# Patient Record
Sex: FEMALE | Race: WHITE | NOT HISPANIC OR LATINO | URBAN - METROPOLITAN AREA
[De-identification: names, ages, dates, MRNs, and addresses within clinical notes are randomized per-mention and may not be internally consistent; named-entity substitution may affect disease eponyms.]

---

## 2017-04-27 ENCOUNTER — GENERIC CONVERSION - ENCOUNTER (OUTPATIENT)
Dept: OTHER | Facility: OTHER | Age: 72
End: 2017-04-27

## 2018-05-07 ENCOUNTER — TELEPHONE (OUTPATIENT)
Dept: OBGYN CLINIC | Facility: CLINIC | Age: 73
End: 2018-05-07

## 2018-05-07 DIAGNOSIS — B00.9 HERPES: Primary | ICD-10-CM

## 2018-05-07 RX ORDER — VALACYCLOVIR HYDROCHLORIDE 500 MG/1
500 TABLET, FILM COATED ORAL 2 TIMES DAILY
Qty: 14 TABLET | Refills: 0 | Status: SHIPPED | OUTPATIENT
Start: 2018-05-07 | End: 2018-08-16 | Stop reason: SDUPTHER

## 2018-05-07 NOTE — TELEPHONE ENCOUNTER
Needs refill of valtrex  Has herpes outbreak  Has appointment with you on may 29th for yearly  Please call into [facility] on [address]

## 2018-05-29 ENCOUNTER — ANNUAL EXAM (OUTPATIENT)
Dept: OBGYN CLINIC | Facility: CLINIC | Age: 73
End: 2018-05-29
Payer: COMMERCIAL

## 2018-05-29 VITALS
WEIGHT: 184.6 LBS | SYSTOLIC BLOOD PRESSURE: 98 MMHG | HEIGHT: 64 IN | DIASTOLIC BLOOD PRESSURE: 76 MMHG | BODY MASS INDEX: 31.51 KG/M2

## 2018-05-29 DIAGNOSIS — Z01.419 WOMEN'S ANNUAL ROUTINE GYNECOLOGICAL EXAMINATION: Primary | ICD-10-CM

## 2018-05-29 DIAGNOSIS — Z12.39 BREAST CANCER SCREENING: ICD-10-CM

## 2018-05-29 PROCEDURE — S0612 ANNUAL GYNECOLOGICAL EXAMINA: HCPCS | Performed by: OBSTETRICS & GYNECOLOGY

## 2018-05-29 RX ORDER — ESCITALOPRAM OXALATE 10 MG/1
10 TABLET ORAL
COMMUNITY
Start: 2018-02-14 | End: 2019-02-14

## 2018-05-29 RX ORDER — ROSUVASTATIN CALCIUM 10 MG/1
5 TABLET, COATED ORAL
COMMUNITY
Start: 2016-09-28

## 2018-05-29 RX ORDER — AMITRIPTYLINE HYDROCHLORIDE 50 MG/1
TABLET, FILM COATED ORAL
COMMUNITY

## 2018-05-29 RX ORDER — ZOLPIDEM TARTRATE 12.5 MG/1
TABLET, FILM COATED, EXTENDED RELEASE ORAL
COMMUNITY
Start: 2018-01-22

## 2018-05-29 RX ORDER — ERGOCALCIFEROL (VITAMIN D2) 1250 MCG
CAPSULE ORAL
COMMUNITY

## 2018-05-29 NOTE — PATIENT INSTRUCTIONS
The patient was informed of a stable postmenopausal gyn examination  A Pap smear was not performed because of her age  Is recommended she continue getting mammograms on a regular basis  She should return my office in 2-3 years pending the onset of new problems

## 2018-05-29 NOTE — PROGRESS NOTES
This is a 74-year-old white female, she is a  3 para 3  She is many years status post hysterectomy  She is menopausal   She has a history of a vaginal sling  She is currently taking medication including Lexapro, Crestor, and Ambien  She was also on Valtrex for a recent genital herpes outbreak  She feels little bit weeks secondary to having the flu followed by pneumonia  She is feeling a bit better at this time  Bladder control since to be well status post her sling procedure  She denies any problem with bladder control  Colonoscopies are up-to-date  She will need to make arrangements for mammogram   There are no new major family illnesses report  She still desires intimacy  Review of systems noncontributory  She is feeling better after being the flu and pneumonia  She has a history of no counseling she has known  replacement injections  Medical history reviewed above  Surgical history significant for total abdominal hysterectomy with BS O  ATV T  Removal gallbladder  Vein surgery  Foot surgery for arthritis  Family history significant for lung disease, COPD for mother, emphysema for grandfather  Physical exam this is a well-developed well-oriented 74-year-old white female  She is in no acute distress  HEENT is was within normal limits cardiac exam shows a regular rhythm and rate no murmur normal S1-S2 does no gallop  Lungs are clear to A& P  There is no wheezing  Breast exam symmetrical no masses nontender axilla clear bilaterally abdominal exam there is evidence of a prior cholecystectomy scar and the prior  scar there is no rebound no guarding positive bowel size  Pelvic exam the external genitalia are normal menopausal state there is actually good moisture  The the cervix uterus is surgically removed  The vault well supported  There is no prolapse  Adnexa clear bilaterally  Impression stable menopausal gyn examination    She will continue to use lubricant with intimacy  She is return my office in 2 years unless new problems arise  Keep me informed of her progress

## 2018-08-16 ENCOUNTER — TELEPHONE (OUTPATIENT)
Dept: OBGYN CLINIC | Facility: CLINIC | Age: 73
End: 2018-08-16

## 2018-08-16 DIAGNOSIS — B00.9 HERPES: ICD-10-CM

## 2018-08-16 RX ORDER — VALACYCLOVIR HYDROCHLORIDE 500 MG/1
500 TABLET, FILM COATED ORAL 2 TIMES DAILY
Qty: 14 TABLET | Refills: 0 | Status: SHIPPED | OUTPATIENT
Start: 2018-08-16 | End: 2018-08-23

## 2020-06-02 ENCOUNTER — ANNUAL EXAM (OUTPATIENT)
Dept: OBGYN CLINIC | Facility: CLINIC | Age: 75
End: 2020-06-02
Payer: COMMERCIAL

## 2020-06-02 VITALS
WEIGHT: 187.4 LBS | HEIGHT: 64 IN | SYSTOLIC BLOOD PRESSURE: 100 MMHG | BODY MASS INDEX: 31.99 KG/M2 | TEMPERATURE: 99.5 F | DIASTOLIC BLOOD PRESSURE: 64 MMHG

## 2020-06-02 DIAGNOSIS — Z01.419 WOMEN'S ANNUAL ROUTINE GYNECOLOGICAL EXAMINATION: ICD-10-CM

## 2020-06-02 DIAGNOSIS — Z12.39 BREAST CANCER SCREENING: Primary | ICD-10-CM

## 2020-06-02 PROCEDURE — 99397 PER PM REEVAL EST PAT 65+ YR: CPT | Performed by: OBSTETRICS & GYNECOLOGY

## 2020-06-02 RX ORDER — SERTRALINE HYDROCHLORIDE 100 MG/1
TABLET, FILM COATED ORAL
COMMUNITY
Start: 2020-05-16

## 2020-06-02 RX ORDER — FUROSEMIDE 40 MG/1
TABLET ORAL
COMMUNITY
Start: 2020-05-18

## 2020-06-02 RX ORDER — LOSARTAN POTASSIUM 100 MG/1
100 TABLET ORAL DAILY
COMMUNITY
Start: 2019-09-09 | End: 2021-12-06

## 2020-06-02 RX ORDER — METOPROLOL SUCCINATE 50 MG/1
50 TABLET, EXTENDED RELEASE ORAL DAILY
COMMUNITY
Start: 2020-02-25 | End: 2021-12-06

## 2020-06-02 RX ORDER — DULOXETIN HYDROCHLORIDE 30 MG/1
30 CAPSULE, DELAYED RELEASE ORAL DAILY
COMMUNITY
Start: 2019-12-11 | End: 2021-08-25

## 2020-11-19 ENCOUNTER — EVALUATION (OUTPATIENT)
Dept: PHYSICAL THERAPY | Age: 75
End: 2020-11-19
Payer: COMMERCIAL

## 2020-11-19 DIAGNOSIS — M51.36 DEGENERATIVE DISC DISEASE, LUMBAR: Primary | ICD-10-CM

## 2020-11-19 PROCEDURE — 97163 PT EVAL HIGH COMPLEX 45 MIN: CPT | Performed by: PHYSICAL THERAPIST

## 2020-11-27 ENCOUNTER — OFFICE VISIT (OUTPATIENT)
Dept: PHYSICAL THERAPY | Age: 75
End: 2020-11-27
Payer: COMMERCIAL

## 2020-11-27 DIAGNOSIS — M51.36 DEGENERATIVE DISC DISEASE, LUMBAR: Primary | ICD-10-CM

## 2020-11-27 PROCEDURE — 97113 AQUATIC THERAPY/EXERCISES: CPT | Performed by: PHYSICAL THERAPIST

## 2020-11-27 RX ORDER — EPLERENONE 50 MG/1
50 TABLET, FILM COATED ORAL DAILY
COMMUNITY

## 2020-11-27 RX ORDER — ASPIRIN 81 MG/1
81 TABLET ORAL 2 TIMES DAILY
COMMUNITY

## 2020-11-30 ENCOUNTER — OFFICE VISIT (OUTPATIENT)
Dept: PHYSICAL THERAPY | Age: 75
End: 2020-11-30
Payer: COMMERCIAL

## 2020-11-30 DIAGNOSIS — M51.36 DEGENERATIVE DISC DISEASE, LUMBAR: Primary | ICD-10-CM

## 2020-11-30 PROCEDURE — 97113 AQUATIC THERAPY/EXERCISES: CPT

## 2020-12-03 ENCOUNTER — OFFICE VISIT (OUTPATIENT)
Dept: PHYSICAL THERAPY | Age: 75
End: 2020-12-03
Payer: COMMERCIAL

## 2020-12-03 DIAGNOSIS — M51.36 DEGENERATIVE DISC DISEASE, LUMBAR: Primary | ICD-10-CM

## 2020-12-03 PROCEDURE — 97113 AQUATIC THERAPY/EXERCISES: CPT | Performed by: PHYSICAL THERAPIST

## 2020-12-09 ENCOUNTER — APPOINTMENT (OUTPATIENT)
Dept: PHYSICAL THERAPY | Age: 75
End: 2020-12-09
Payer: COMMERCIAL

## 2020-12-10 ENCOUNTER — OFFICE VISIT (OUTPATIENT)
Dept: PHYSICAL THERAPY | Age: 75
End: 2020-12-10
Payer: COMMERCIAL

## 2020-12-10 DIAGNOSIS — M51.36 DEGENERATIVE DISC DISEASE, LUMBAR: Primary | ICD-10-CM

## 2020-12-10 PROCEDURE — 97113 AQUATIC THERAPY/EXERCISES: CPT

## 2020-12-14 ENCOUNTER — OFFICE VISIT (OUTPATIENT)
Dept: PHYSICAL THERAPY | Age: 75
End: 2020-12-14
Payer: COMMERCIAL

## 2020-12-14 DIAGNOSIS — M51.36 DEGENERATIVE DISC DISEASE, LUMBAR: Primary | ICD-10-CM

## 2020-12-14 PROCEDURE — 97113 AQUATIC THERAPY/EXERCISES: CPT

## 2020-12-16 ENCOUNTER — APPOINTMENT (OUTPATIENT)
Dept: PHYSICAL THERAPY | Age: 75
End: 2020-12-16
Payer: COMMERCIAL

## 2020-12-17 ENCOUNTER — OFFICE VISIT (OUTPATIENT)
Dept: PHYSICAL THERAPY | Age: 75
End: 2020-12-17
Payer: COMMERCIAL

## 2020-12-17 ENCOUNTER — TRANSCRIBE ORDERS (OUTPATIENT)
Dept: PHYSICAL THERAPY | Age: 75
End: 2020-12-17

## 2020-12-17 DIAGNOSIS — M51.36 DEGENERATIVE DISC DISEASE, LUMBAR: Primary | ICD-10-CM

## 2020-12-17 DIAGNOSIS — M51.36 DEGENERATION OF LUMBAR INTERVERTEBRAL DISC: Primary | ICD-10-CM

## 2020-12-17 PROCEDURE — 97113 AQUATIC THERAPY/EXERCISES: CPT | Performed by: PHYSICAL THERAPIST

## 2020-12-17 PROCEDURE — 97750 PHYSICAL PERFORMANCE TEST: CPT | Performed by: PHYSICAL THERAPIST

## 2020-12-18 ENCOUNTER — APPOINTMENT (OUTPATIENT)
Dept: PHYSICAL THERAPY | Age: 75
End: 2020-12-18
Payer: COMMERCIAL

## 2020-12-21 ENCOUNTER — OFFICE VISIT (OUTPATIENT)
Dept: PHYSICAL THERAPY | Age: 75
End: 2020-12-21
Payer: COMMERCIAL

## 2020-12-21 DIAGNOSIS — M51.36 DEGENERATIVE DISC DISEASE, LUMBAR: Primary | ICD-10-CM

## 2020-12-21 PROCEDURE — 97113 AQUATIC THERAPY/EXERCISES: CPT

## 2020-12-28 ENCOUNTER — APPOINTMENT (OUTPATIENT)
Dept: PHYSICAL THERAPY | Age: 75
End: 2020-12-28
Payer: COMMERCIAL

## 2020-12-30 ENCOUNTER — APPOINTMENT (OUTPATIENT)
Dept: PHYSICAL THERAPY | Age: 75
End: 2020-12-30
Payer: COMMERCIAL

## 2021-02-08 ENCOUNTER — TRANSCRIBE ORDERS (OUTPATIENT)
Dept: PHYSICAL THERAPY | Age: 76
End: 2021-02-08

## 2021-02-08 DIAGNOSIS — M51.36 DEGENERATION OF LUMBAR INTERVERTEBRAL DISC: Primary | ICD-10-CM

## 2021-03-02 DIAGNOSIS — Z23 ENCOUNTER FOR IMMUNIZATION: ICD-10-CM

## 2021-08-05 ENCOUNTER — TELEPHONE (OUTPATIENT)
Dept: HEMATOLOGY ONCOLOGY | Facility: CLINIC | Age: 76
End: 2021-08-05

## 2021-08-05 NOTE — TELEPHONE ENCOUNTER
New Patient Request   Patient Details:     Jami Kaur      1945      [id number]      Reason for Appointment   Who is calling to schedule? Yousif    If not Patient, what is their name? From office    What is the diagnosis? Abnormal protein    Who is the referring doctor? Dr Mg Aguayo are you scheduling with ? Hemotology    Preferred [employer] Number to call back on? If calling from the [facility], use the Nurse number   [phone]   Miscellaneous Information: Only afternoon/ will be faxing lab orders    Please advise the patient, a new patient  will be calling them back within 1 business day

## 2021-08-10 ENCOUNTER — TELEPHONE (OUTPATIENT)
Dept: SURGICAL ONCOLOGY | Facility: CLINIC | Age: 76
End: 2021-08-10

## 2021-08-10 NOTE — TELEPHONE ENCOUNTER
New Patient Encounter    New Patient Intake Form   Patient Details:  Mady Cherry  1945  [id number]    Background Information:  [address] starts by opening a telephone encounter and gathering the following information   Who is calling to schedule? If not self, relationship to patient? Patient   Referring Provider Dr Anand Watts   What is the diagnosis? SPE pattern demonstrates the possibility of a faint band previously identified as a monoclonal protein (faint IgM Kappa   Is this Cancer or Non-Cancer? Non-Cancer   Is this diagnosis confirmed? Yes   When was the diagnosis? 7/2021 labs   Is there a confirmed diagnosis from a biopsy/tissue reviewed by pathology? No   Were outside slides requested? No   Is patient aware of diagnosis? Yes   Is there a personal history and what kind? No   Is there a family history and what kind? No   Reason for visit? New Diagnosis   Have you had any imaging or labs done? If so: when, where? yes  [facility]   Are records in EPIC? yes   If patient has a prior history of cancer were old records obtained? NA   Was the patient told to bring a disk? No   Does the patient smoke or Vape? no   If yes, how many packs or cartridges per day? Scheduling Information:   Preferred [place]:  [place]     Are there any dates/time the patient cannot be seen? Miscellaneous:    After completing the above information, please route to Financial Counselor and the appropriate Nurse Navigator for review

## 2021-08-24 ENCOUNTER — TELEPHONE (OUTPATIENT)
Dept: HEMATOLOGY ONCOLOGY | Facility: CLINIC | Age: 76
End: 2021-08-24

## 2021-08-24 NOTE — TELEPHONE ENCOUNTER
I called patient and left voicemail reminding her of her appointment tomorrow   I also requested she call us to go over the Covid Screening questions  -Marti Ruby

## 2021-08-25 ENCOUNTER — CONSULT (OUTPATIENT)
Dept: HEMATOLOGY ONCOLOGY | Facility: CLINIC | Age: 76
End: 2021-08-25
Payer: COMMERCIAL

## 2021-08-25 VITALS
RESPIRATION RATE: 16 BRPM | TEMPERATURE: 98.8 F | OXYGEN SATURATION: 94 % | HEART RATE: 69 BPM | SYSTOLIC BLOOD PRESSURE: 102 MMHG | BODY MASS INDEX: 33.27 KG/M2 | WEIGHT: 187.8 LBS | HEIGHT: 63 IN | DIASTOLIC BLOOD PRESSURE: 62 MMHG

## 2021-08-25 DIAGNOSIS — E88.09 PLASMA CELL DYSCRASIA: Primary | ICD-10-CM

## 2021-08-25 PROCEDURE — 99205 OFFICE O/P NEW HI 60 MIN: CPT | Performed by: INTERNAL MEDICINE

## 2021-08-25 RX ORDER — METHIMAZOLE 5 MG/1
TABLET ORAL
COMMUNITY
Start: 2021-08-05

## 2021-08-25 RX ORDER — DIVALPROEX SODIUM 500 MG/1
TABLET, DELAYED RELEASE ORAL
COMMUNITY
Start: 2021-06-09

## 2021-08-25 NOTE — PROGRESS NOTES
Hematology/Oncology Consult Note    Date of Service: 8/25/2021    [facility]  [address]  [facility] [id number] [phone]    Reason for Consultation:   abnormal SPEP result    AJCC 8th Edition Cancer Stage:  Cancer Staging  No matching staging information was found for the patient  Referral Physician: Dr Harriet Cardona    Oncology/Hematology History:  ·  June 9, 2021 SPEP  Shows a faint band previous identified as a monoclonal protein, IgM kappa type  · August 10, 2021 SPEP showed a faint band  · July 20, 2021 SPEP shows possibly a faint band previously identified as monoclonal protein, faint IgM kappa type  · July 27, 2021 TSH normal   Free T4 normal   CMP showed creatinine 1 22, BUN 20, eGFR 44  CBC normal   ·  August 19, 2021, BUN 39, creatinine 1 8  eGFR 27   · August 23, 2021, creatinine 0 95, eGFR 59  Assessment and Recommendations:     I personally reviewed the lab results,  the image studies,  pathology, other specialty/physicians consult notes and recommendations, and outside medical records  I had a lengthy discussion with the patient and shared the work-up findings  We discussed the diagnosis and management plan as below  1  Plasma cell dyscrasia, 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  Most recent lab showed normal CBC and CMP  Calcium is normal   Total protein also within normal limits  This can be a non specific findings versus and MGUS  I will check quantitative immunoglobulin, serum free light chain  Patient had negative CT scan and X -Rays in [facility] that already covered most of the bones  I will follow the results  Bone marrow biopsy only if patient has significant elevated serum free light chain ratio and M spike  I will repeat CBC, CMP, quantitative immunoglobulin, serum free light chain, SPEP in 6 months  2  Idiopathic epilepsy  No seizure activity  Patient continues current medication  Following up with neurologist     3  Follow-up: Return to clinic in 6 months with labs prior  Thank you very much for your consultation and making us part of this nice patient's care  I will continue to follow closely with you  Please contact me with any additional questions  Disclaimer: This document was prepared using [employer] Direct technology  If a word or phrase is confusing, or does not make sense, this is likely due to recognition error which was not discovered during the providers review  If you believe an error has occurred, please contact me through [employer] service for mulu? cation  HPI:   Mine Frost is a 78 y o  female with a past medical history of  Idiopathic epilepsy that was evaluated by neurologist Dayanara Mancera in  [facility]  Lab showed normal CBC and slight elevated creatinine  SPEP in June 2021 showed a faint monoclonal protein, IgM kappa type  Repeat SPEP in July 20, 2021 again showed a faint band, previous identified as IgM kappa type  Most recent lab showed normal kidney function and CBC  The patient is referred to Hematology Clinic for evaluation of possible plasma cell dyscrasia  Patient feels fine except itching  No fever or chills  No exertional chest pain, diaphoresis or shortness  of breath  No cough and phlegm, no hemoptysis  Patient had nausea  without vomiting this morning  No abdominal pain  No diarrhea or constipation  No  symptoms  No headache or blurred vision  No seizure activity  Appetite good  No significant weight loss or weight gain  The patient denies bleeding anywhere  Nausea in the morning, no vomiting  Patient also complained of itching comes and goes      Review of system:  12-point review of system was performed, pertinent positive and negative were detailed as above    Past Medical History:   Diagnosis Date    Pacemaker     Total knee replacement status, left        Past Surgical History: Procedure Laterality Date    CHOLECYSTECTOMY      HYSTERECTOMY         No family history on file  Social History     Socioeconomic History    Marital status: /Civil Union     Spouse name: Not on file    Number of children: Not on file    Years of education: Not on file    Highest education level: Not on file   Occupational History    Not on file   Tobacco Use    Smoking status: Never Smoker    Smokeless tobacco: Never Used   Substance and Sexual Activity    Alcohol use: Yes     Comment: very little    Drug use: No    Sexual activity: Yes     Partners: Male     Birth control/protection: Post-menopausal   Other Topics Concern    Not on file   Social History Narrative    Not on file     Social Determinants of Health     Financial Resource Strain:     Difficulty of Paying Living Expenses:    Food Insecurity:     Worried About Running Out of Food in the Last Year:     [address] in the Last Year:    Transportation Needs:     Lack of Transportation (Medical):  Lack of Transportation (Non-Medical):    Physical Activity:     Days of Exercise per Week:     Minutes of Exercise per Session:    Stress:     Feeling of Stress :    Social Connections:     Frequency of Communication with Friends and Family:     Frequency of Social Gatherings with Friends and Family:     Attends [employer] Services:     Active Member of Clubs or Organizations:     Attends Club or Organization Meetings:     Marital Status:    Intimate Partner Violence:     Fear of Current or Ex-Partner:     Emotionally Abused:     Physically Abused:     Sexually Abused:         Allergies   Allergen Reactions    Penicillin G     Sacubitril-Valsartan      Possible angioedema       Current Outpatient Medications   Medication Sig Dispense Refill    aspirin (ECOTRIN LOW STRENGTH) 81 mg EC tablet Take 81 mg by mouth 2 (two) times a day      denosumab (PROLIA) 60 mg/mL Inject 60 mg under the skin      denosumab (PROLIA) 60 mg/mL Inject 60 mg under the skin      divalproex sodium (DEPAKOTE) 500 mg EC tablet       DULoxetine (Cymbalta) 30 mg delayed release capsule Take 30 mg by mouth daily      eplerenone (INSPRA) 50 MG tablet Take 50 mg by mouth daily      ergocalciferol (ERGOCALCIFEROL) 05640 units capsule TK 1 C PO 1 TIME A WEEK      furosemide (LASIX) 40 mg tablet TAKE 1 TABLET BY MOUTH EVERY DAY      losartan (COZAAR) 100 MG tablet Take 100 mg by mouth daily      methimazole (TAPAZOLE) 5 mg tablet       metoprolol succinate (TOPROL-XL) 50 mg 24 hr tablet Take 50 mg by mouth daily      rosuvastatin (CRESTOR) 10 MG tablet Take 5 mg by mouth      amitriptyline (ELAVIL) 50 mg tablet TK 1 T PO  D (Patient not taking: Reported on 8/25/2021)      escitalopram (LEXAPRO) 10 mg tablet Take 10 mg by mouth (Patient not taking: Reported on 8/25/2021)      sertraline (ZOLOFT) 100 mg tablet  (Patient not taking: Reported on 8/25/2021)      valACYclovir (VALTREX) 500 mg tablet Take 1 tablet (500 mg total) by mouth 2 (two) times a day for 7 days (Patient not taking: Reported on 8/25/2021) 14 tablet 0    zolpidem (AMBIEN CR) 12 5 MG CR tablet TAKE 1 TABLET BY MOUTH EVERY DAY AT BEDTIME AS NEEDED FOR SLEEP (Patient not taking: Reported on 8/25/2021)       No current facility-administered medications for this visit  Objective:     24 Hour Vitals Assessment:     Vitals:    08/25/21 1350   BP: 102/62   Pulse: 69   Resp: 16   Temp: 98 8 °F (37 1 °C)   SpO2: 94%       PHYSICIAN EXAM:    General: Appearance: alert, cooperative, no distress  HEENT: Normocephalic, atraumatic  No scleral icterus  conjunctivae clear  PERRL, EOM's intact  No sinus drainage or tenderness  Chest: No tenderness  Lungs: Clear to auscultation bilaterally, Respirations unlabored  Cardiac: Regular rate and rhythm, S1and S2 are normal, no murmur, click, rubs or gallops  Abdomen: Soft, non-tender, non-distended   Bowel sounds are normal  No masses, no organomegaly  Pelvic: deferred  Extremities:  No edema, cyanosis, clubbing  Skin: Skin color, turgor are normal  No rashes  Lymphatics: no palpable adenopathy  Neurologic: Alert and oriented X 3, Cranial nerve 2-12 grosely intact  Normal strength and sensation  DATA REVIEW:    Pathology Result:    No results found for: FINAL[facility]     Image Results:   Image result are reviewed and documented in Hematology/Oncology history    No image results found  LABS:  Lab data are reviewed and documented in HemOnc history  No results found for this or any previous visit (from the past 72 hour(s))  By:  Oskar Oreilly MD, 8/25/2021, 2:52 PM                                  Primary Care Physician:  No primary care provider on file

## 2021-08-27 ENCOUNTER — TELEPHONE (OUTPATIENT)
Dept: HEMATOLOGY ONCOLOGY | Facility: CLINIC | Age: 76
End: 2021-08-27

## 2021-12-06 ENCOUNTER — OFFICE VISIT (OUTPATIENT)
Dept: OBGYN CLINIC | Facility: CLINIC | Age: 76
End: 2021-12-06
Payer: COMMERCIAL

## 2021-12-06 VITALS
DIASTOLIC BLOOD PRESSURE: 60 MMHG | BODY MASS INDEX: 33.35 KG/M2 | HEIGHT: 63 IN | SYSTOLIC BLOOD PRESSURE: 100 MMHG | WEIGHT: 188.2 LBS

## 2021-12-06 DIAGNOSIS — L02.92 BOIL: Primary | ICD-10-CM

## 2021-12-06 PROCEDURE — 99213 OFFICE O/P EST LOW 20 MIN: CPT | Performed by: OBSTETRICS & GYNECOLOGY

## 2022-01-17 ENCOUNTER — NEW PATIENT (OUTPATIENT)
Dept: URBAN - METROPOLITAN AREA CLINIC 6 | Facility: CLINIC | Age: 77
End: 2022-01-17

## 2022-01-17 DIAGNOSIS — H52.13: ICD-10-CM

## 2022-01-17 DIAGNOSIS — H02.831: ICD-10-CM

## 2022-01-17 DIAGNOSIS — Z96.1: ICD-10-CM

## 2022-01-17 DIAGNOSIS — H35.371: ICD-10-CM

## 2022-01-17 DIAGNOSIS — H43.813: ICD-10-CM

## 2022-01-17 DIAGNOSIS — H02.834: ICD-10-CM

## 2022-01-17 PROCEDURE — 92015 DETERMINE REFRACTIVE STATE: CPT

## 2022-01-17 PROCEDURE — 92004 COMPRE OPH EXAM NEW PT 1/>: CPT

## 2022-01-17 ASSESSMENT — VISUAL ACUITY
OD_CC: 20/25+1
OS_CC: J1
OS_CC: 20/25-1
OD_CC: J1+

## 2022-01-17 ASSESSMENT — TONOMETRY
OD_IOP_MMHG: 15
OS_IOP_MMHG: 14

## 2022-01-31 ENCOUNTER — TELEPHONE (OUTPATIENT)
Dept: HEMATOLOGY ONCOLOGY | Facility: CLINIC | Age: 77
End: 2022-01-31

## 2022-01-31 NOTE — TELEPHONE ENCOUNTER
I phoned the patient and left a voicemail message indicating that her appointment with Dr Doe Light on 2/23 would need to be rescheduled to 3/1 at 1500, as Dr Doe Light will not be in the office on 2/23  The [employer] number was provided in the event this appointment does not work for the patient  A change of appointment letter was sent to the patient's residence

## 2022-03-09 ENCOUNTER — OFFICE VISIT (OUTPATIENT)
Dept: HEMATOLOGY ONCOLOGY | Facility: CLINIC | Age: 77
End: 2022-03-09
Payer: COMMERCIAL

## 2022-03-09 VITALS
SYSTOLIC BLOOD PRESSURE: 102 MMHG | WEIGHT: 186 LBS | BODY MASS INDEX: 32.96 KG/M2 | OXYGEN SATURATION: 98 % | TEMPERATURE: 98 F | HEIGHT: 63 IN | RESPIRATION RATE: 17 BRPM | HEART RATE: 65 BPM | DIASTOLIC BLOOD PRESSURE: 70 MMHG

## 2022-03-09 DIAGNOSIS — E88.09 PLASMA CELL DYSCRASIA: Primary | ICD-10-CM

## 2022-03-09 PROCEDURE — 99213 OFFICE O/P EST LOW 20 MIN: CPT | Performed by: INTERNAL MEDICINE

## 2022-03-09 NOTE — PROGRESS NOTES
Hematology/Oncology Progress Note    Date of Service: 3/9/2022    [facility]  [address] [place] [id number] [phone]    Hem/Onc Problem List:     MGUS, IgM type    Chief Complaint:   Routine follow-up for management of plasma cell dyscrasia    Assessment/Plan:   I personally reviewed the lab results, image studies results and other specialties/physicians consult notes and recommendations  I shared the findings with patient and family, discussed the diagnosis and management plan as below  1  MGUS, 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  Patient had negative CT scan and X -Rays  Patient is on observation  Repeat lab showed no evidence of disease progression  I will repeat CBC, CMP, quantitative immunoglobulin, serum free light chain, SPEP in 6 months  Bone marrow biopsy only if patient has significant elevated serum free light chain ratio and M spike  2  Idiopathic epilepsy  No seizure activity  Patient continues current medication  Following up with neurologist     3  Follow-up: Return to clinic in 6 months with labs prior  Disclaimer: This document was prepared using [employer] technology  If a word or phrase is confusing, or does not make sense, this is likely due to recognition error which was not discovered during the providers review  If you believe an error has occurred, please Contact me through [employer] service for mulu? cation  AJCC 8th Edition Cancer Stage :    Cancer Staging  No matching staging information was found for the patient  Hematology/Oncology History:   · June 9, 2021 SPEP  Shows a faint band previous identified as a monoclonal protein, IgM kappa type  · August 10, 2021 SPEP showed a faint band  · July 20, 2021 SPEP shows possibly a faint band previously identified as monoclonal protein, faint IgM kappa type    · July 27, 2021 TSH normal   Free T4 normal  CMP showed creatinine 1 22, BUN 20, eGFR 44  CBC normal   ·  August 19, 2021, BUN 39, creatinine 1 8  eGFR 27   · August 23, 2021, creatinine 0 95, eGFR 59  · February 19, 2022, SPEP showed M faint IgM kappa monoclonal protein  Normal kappa and lambda free light chain, free light chain ratio  Normal quantitative immunoglobulin  History of Present Illiness:   Sushma Esposito is a 78 y o  female with the above-noted HemOnc history who is here for routine follow-up  Patient has plasma dyscrasia, IgM type MGUS  Repeat lab showed a faint M spike  Normal serum free light chain and ratio  Normal quantitative immunoglobulin  Kidney function and CBC normal     Patient feels fine  No fever or chills  No exertional chest pain, diaphoresis or shortness  of breath  No cough and phlegm, no hemoptysis  Patient denied nausea  and vomiting  No abdominal pain  No diarrhea or constipation  No  symptoms  No headache or blurred vision  No seizure activity  Appetite good  No significant weight loss or weight gain  The patient denies bleeding anywhere  ROS: A 12-point of review of systems is obtained and other than the above is noncontributory  Objective:   VITALS:   /70 (BP Location: Right arm, Patient Position: Sitting, Cuff Size: Adult)   Pulse 65   Temp 98 °F (36 7 °C)   Resp 17   Ht 5' 3" (1 6 m)   Wt 84 4 kg (186 lb)   SpO2 98%   BMI 32 95 kg/m²     Physical EXAM:  General:  Alert, cooperative, no distress, appears stated age  Head:  Normocephalic, without obvious abnormality, atraumatic  Eyes:  Conjunctivae/corneas clear  Evidence of conjunctivitis     Throat: Deferred  Neck: Supple, symmetrical, trachea midline, no adenopathy    Lungs:   Clear to auscultation bilaterally  Respiratory effort easy, nonlabored    Heart:  Regular rate and rhythm, S1, S2 normal, no murmur  Abdomen:   Soft, non-tender,nondistended  Bowel sounds normal  No masses,  No organomegaly       Extremities: Lymph nodes: No edema  No axillary or inguinal adenopathy   Skin: No skin rash  Neurologic: A&Ox4  No focal neuro deficits       Allergies   Allergen Reactions    Vancomycin Hives, Itching and Rash    Penicillin G     Sacubitril-Valsartan      Possible angioedema       Past Medical History:   Diagnosis Date    Pacemaker     Total knee replacement status, left        Past Surgical History:   Procedure Laterality Date    CHOLECYSTECTOMY      HYSTERECTOMY         No family history on file      Social History     Socioeconomic History    Marital status: /Civil Union     Spouse name: Not on file    Number of children: Not on file    Years of education: Not on file    Highest education level: Not on file   Occupational History    Not on file   Tobacco Use    Smoking status: Never Smoker    Smokeless tobacco: Never Used   Substance and Sexual Activity    Alcohol use: Yes     Comment: very little    Drug use: No    Sexual activity: Yes     Partners: Male     Birth control/protection: Post-menopausal   Other Topics Concern    Not on file   Social History Narrative    Not on file     Social Determinants of Health     Financial Resource Strain: Not on file   Food Insecurity: Not on file   Transportation Needs: Not on file   Physical Activity: Not on file   Stress: Not on file   Social Connections: Not on file   Intimate Partner Violence: Not on file   Housing Stability: Not on file       Current Outpatient Medications   Medication Sig Dispense Refill    aspirin (ECOTRIN LOW STRENGTH) 81 mg EC tablet Take 81 mg by mouth 2 (two) times a day      denosumab (PROLIA) 60 mg/mL Inject 60 mg under the skin      divalproex sodium (DEPAKOTE) 500 mg EC tablet       eplerenone (INSPRA) 50 MG tablet Take 50 mg by mouth daily      ergocalciferol (ERGOCALCIFEROL) 06638 units capsule TK 1 C PO 1 TIME A WEEK      furosemide (LASIX) 40 mg tablet TAKE 1 TABLET BY MOUTH EVERY DAY      methimazole (TAPAZOLE) 5 mg tablet       rosuvastatin (CRESTOR) 10 MG tablet Take 5 mg by mouth      amitriptyline (ELAVIL) 50 mg tablet TK 1 T PO  D (Patient not taking: Reported on 8/25/2021)      denosumab (PROLIA) 60 mg/mL Inject 60 mg under the skin      DULoxetine (Cymbalta) 30 mg delayed release capsule Take 30 mg by mouth daily      escitalopram (LEXAPRO) 10 mg tablet Take 10 mg by mouth (Patient not taking: Reported on 8/25/2021)      losartan (COZAAR) 100 MG tablet Take 100 mg by mouth daily      metoprolol succinate (TOPROL-XL) 50 mg 24 hr tablet Take 50 mg by mouth daily      sertraline (ZOLOFT) 100 mg tablet  (Patient not taking: Reported on 8/25/2021)      valACYclovir (VALTREX) 500 mg tablet Take 1 tablet (500 mg total) by mouth 2 (two) times a day for 7 days (Patient not taking: Reported on 8/25/2021) 14 tablet 0    zolpidem (AMBIEN CR) 12 5 MG CR tablet TAKE 1 TABLET BY MOUTH EVERY DAY AT BEDTIME AS NEEDED FOR SLEEP (Patient not taking: Reported on 8/25/2021)       No current facility-administered medications for this visit  DATA REVIEW:    Pathology Result:    No results found for: [facility]     Image Results: They are reviewed and documented in Hematology/Oncology history    No image results found  LABS:  Lab data are reviewed and documented in HemOnc history  No results found for this or any previous visit (from the past 48 hour(s))      Marlena Guevara MD  3/9/2022, 3:19 PM

## 2022-05-25 ENCOUNTER — FOLLOW UP (OUTPATIENT)
Dept: URBAN - METROPOLITAN AREA CLINIC 6 | Facility: CLINIC | Age: 77
End: 2022-05-25

## 2022-05-25 DIAGNOSIS — D23.112: ICD-10-CM

## 2022-05-25 DIAGNOSIS — H02.831: ICD-10-CM

## 2022-05-25 DIAGNOSIS — H02.834: ICD-10-CM

## 2022-05-25 PROCEDURE — 92083 EXTENDED VISUAL FIELD XM: CPT

## 2022-05-25 PROCEDURE — 92285 EXTERNAL OCULAR PHOTOGRAPHY: CPT

## 2022-05-25 PROCEDURE — 92012 INTRM OPH EXAM EST PATIENT: CPT

## 2022-05-25 ASSESSMENT — TONOMETRY
OS_IOP_MMHG: 10
OD_IOP_MMHG: 10

## 2022-05-25 ASSESSMENT — VISUAL ACUITY
OS_CC: 20/25
OU_CC: J1+
OD_CC: 20/25

## 2022-07-26 ENCOUNTER — SURGERY/PROCEDURE (OUTPATIENT)
Dept: URBAN - METROPOLITAN AREA SURGICAL CENTER 6 | Facility: SURGICAL CENTER | Age: 77
End: 2022-07-26

## 2022-07-26 DIAGNOSIS — H02.834: ICD-10-CM

## 2022-07-26 DIAGNOSIS — H02.831: ICD-10-CM

## 2022-07-26 PROCEDURE — 15823 BLEPHARP UPR EYELID XCSV SKN: CPT | Mod: 50

## 2022-08-04 ENCOUNTER — 1 WEEK POST-OP (OUTPATIENT)
Dept: URBAN - METROPOLITAN AREA CLINIC 6 | Facility: CLINIC | Age: 77
End: 2022-08-04

## 2022-08-04 DIAGNOSIS — Z98.890: ICD-10-CM

## 2022-08-04 PROCEDURE — 99024 POSTOP FOLLOW-UP VISIT: CPT

## 2022-08-04 ASSESSMENT — VISUAL ACUITY
OS_CC: 20/25
OU_CC: J1+
OD_CC: 20/25

## 2022-08-04 ASSESSMENT — TONOMETRY
OD_IOP_MMHG: 11
OS_IOP_MMHG: 12

## 2022-09-07 ENCOUNTER — POST-OP CHECK (OUTPATIENT)
Dept: URBAN - METROPOLITAN AREA CLINIC 6 | Facility: CLINIC | Age: 77
End: 2022-09-07

## 2022-09-07 DIAGNOSIS — Z98.890: ICD-10-CM

## 2022-09-07 PROCEDURE — 99024 POSTOP FOLLOW-UP VISIT: CPT

## 2022-09-07 ASSESSMENT — TONOMETRY
OS_IOP_MMHG: 13
OD_IOP_MMHG: 13

## 2022-09-07 ASSESSMENT — VISUAL ACUITY
OD_CC: 20/25
OS_CC: 20/20-1

## 2022-09-21 ENCOUNTER — TELEPHONE (OUTPATIENT)
Dept: HEMATOLOGY ONCOLOGY | Facility: CLINIC | Age: 77
End: 2022-09-21

## 2022-09-21 NOTE — TELEPHONE ENCOUNTER
9/21/22 LMOM cancelling appt due to uncompleted labs  Gave hopeline number to reschedule and advised to get labs done one week prior

## 2022-09-21 NOTE — TELEPHONE ENCOUNTER
CALL RETURN FORM   Reason for patient call? Patient needs orders sent to lab on [facility], not a [employer]  Doesn't remember exactly which she uses  Will call to resched appmt once she figures out labs orders  Patient's primary oncologist? Jennifer Roberts    Name of person the patient was calling for? Sangita   Any additional information to add, if applicable? [phone]   Informed patient that the message will be forwarded to the team and someone will get back to them as soon as possible    Did you relay this information to the patient?  yes

## 2022-11-08 ENCOUNTER — EVALUATION (OUTPATIENT)
Dept: PHYSICAL THERAPY | Facility: REHABILITATION | Age: 77
End: 2022-11-08

## 2022-11-08 DIAGNOSIS — R68.84 CHRONIC JAW PAIN: Primary | ICD-10-CM

## 2022-11-08 DIAGNOSIS — G89.29 CHRONIC JAW PAIN: Primary | ICD-10-CM

## 2022-11-08 DIAGNOSIS — M54.2 CHRONIC NECK PAIN: ICD-10-CM

## 2022-11-08 DIAGNOSIS — M26.622 ARTHRALGIA OF LEFT TEMPOROMANDIBULAR JOINT: ICD-10-CM

## 2022-11-08 DIAGNOSIS — G89.29 CHRONIC NECK PAIN: ICD-10-CM

## 2022-11-08 NOTE — PROGRESS NOTES
PT Evaluation     Today's date: 2022  Patient name: Weston Acuña  : 1945  MRN: [id number]  Referring provider: oMses Larios MD  Dx:   Encounter Diagnosis     ICD-10-CM    1  Chronic jaw pain  R68 84     G89 29    2  Arthralgia of left temporomandibular joint  M26 622 Ambulatory Referral to Physical Therapy   3  Chronic neck pain  M54 2     G89 29                   Assessment  Assessment details: Weston Acuña is a pleasant 78 y o  female who presents with chronic neck pain, jaw pain, and a history of headaches  She did have a consult with ENT, and has had jaw pain issues in the past about 15 years ago  Her primary movement impairment at this time is poor cervicothoracic region motor control, resulting in pathoanatomical symptoms consistent with her referring diagnosis  Upon exam today, she was limited with cervical spine AROM and PROM, mostly into left rotation and extension, had poor thoracic spine mobility, a poor resting posture of forward head and rounded shoulders, pain with mouth opening although mobility was within functional limits, and was moderately irritable with palpation of several head/neck regions, including her temporalis, C1 joints, CTJ, and suboccipital region  No red flags were evident upon her exam today  At this time, the patient is an excellent candidate for skilled physical therapy intervention to address her current deficits, improve her quality of life, and restore her PLOF  The patient was educated today on prognosis and expected outcomes regarding her physical therapy plan of care, as well as an individualized home exercise program to initiate     Impairments: abnormal coordination, abnormal gait, abnormal muscle firing, abnormal muscle tone, abnormal or restricted ROM, abnormal movement, activity intolerance, impaired balance, impaired physical strength, lacks appropriate home exercise program, pain with function, weight-bearing intolerance, poor posture  and poor body mechanics    Symptom irritability: moderateUnderstanding of Dx/Px/POC: good   Prognosis: good    Goals  Impairment Based Goals:   Patient will have a decrease in pain by at least 50% in 4 weeks  Patient will improve FOTO greater than predicted increase by discharge  Patient will improve cervical spine AROM by at least 25% in 4 weeks  Patient will improve DNF endurance by at least 5 seconds in 5 weeks  Functional Based Goals: To be met upon discharge  Patient will be independent with home exercise program    Patient will be able to manage symptoms independently  Patient will be independent with 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  Patient will be independent with household ADLs  Patient will be independent with mouth opening and eating without limitations due to jaw pain  Plan  Patient would benefit from: skilled speech therapy  Referral necessary: No  Planned therapy interventions: abdominal trunk stabilization, balance, balance/weight bearing training, behavior modification, body mechanics training, coordination, fine motor coordination training, flexibility, functional ROM exercises, gait training, graded activity, graded exercise, graded motor, home exercise program, stretching, strengthening, self care, postural training, patient education, neuromuscular re-education, motor coordination training, massage, manual therapy, joint mobilization, therapeutic activities, therapeutic exercise and breathing training  Frequency: 2x week  Plan of Care beginning date: 11/8/2022  Plan of Care expiration date: 1/3/2023  Treatment plan discussed with: patient        Subjective Evaluation    History of Present Illness  Mechanism of injury: Been dealing with some pain in the back of my ear as well as headaches frequently  I also feel neck soreness when I am trying to turn my head from side to side  My jaw also clicks from time to time, but not all the time   I did have TMJ issues about 15 years ago, and did have some braces to take care of it for 3 years  I have also had a nose issues, and my ENT physician thinks the pain I am having is TMJ again  I have a lot discomfort straight in the back of my neck, most likely from working on the computer all day  The right side of the jaw tends to bother me more than the left, but tends to bother me on both sides  This recent episode of jaw symptoms have been going on for the past couple of months  I also had an eye surgery this past July for my eyelids, but since then I have had a lot of scar build up, and my eyelids feel very heavy as a result  Not sure if that is giving me the headaches  Recurrent probem    Pain  Current pain ratin  At best pain ratin  At worst pain ratin  Quality: dull ache, discomfort, grinding and tight (clicking intermittently, mostly with chewing foods)  Relieving factors: relaxation, rest and change in position  Aggravating factors: eating    Social Support    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[facility] )  Treatments  Previous treatment: chiropractic (ENT physician )  Current treatment: physical therapy  Patient Goals  Patient goals for therapy: decreased pain, improved balance, increased motion, increased strength, independence with ADLs/IADLs and return to sport/leisure activities          Objective     Concurrent Complaints  Positive for dizziness (sometimes feeling off balance) and headaches  Negative for faints, trouble swallowing, difficulty breathing, respiratory pain, visual change, history of trauma and infection    Tests   Cervical   Negative Sharp-Ana test, transverse ligament test and VBI  General Comments:      Cervical/Thoracic Comments  Cervical Spine AROM:   Flexion: 25% limited   Extension: 50% limited  Rotation: R 25% limited  L 50% limited  Sidebending: R 50% limited  L 50% limited    Cervical Spine PROM:   Flexion:  WNL Rotation: R 15% limited L 25% limited  Sidebending: R 25% limited L 25% limited    Palpation:   (+) C1 Bilaterally   (+) Temporalis Bilaterally   (+) CTJ   (+) Suboccipital musculature B    Posture: Forward Head  Rounded Shoulders     DNF Endurance: 3 seconds    Upper Cervical Rotation Test:   (+) Left    Thoracic Spine AROM:   Rotation: R 25% limited  L 25% limited  Extension: 50% limited    Joint Play Cervical Spine: Hypomobile       TMJ   Jaw observations: facial symmetry within normal limits  normal jaw occlusion  Scalloping of tongue: no  Cusp wear: no  Jaw trauma: no  Joint sounds left: clicking  Joint sounds right: clicking  ROM: pain with movement  Opening (mm): within normal limits   Lateral excursion, left (mm): within normal limits  Lateral excursion, right (mm)t: within normal limits   Protrusion (mm): within normal limits   Neuro Exam:     Headaches   Patient reports headaches: Yes                Precautions: History of TMJ pathology, History of headaches, current pacemaker, previous history of heart failure, left knee previous TKA, history of varicose veins, current thyroid nodules       Manuals 11/8            SOR SE            CTJ Mobilization             Thoracic Spine Mobilizations             C1 Mobilizations SE            Neuro Re-Ed             Controlled Opening TMJ 10x            TMJ Isometrics             Chin Tucks 10x            Scapular Retractions 10x            Rows             Shoulder Extensions             Bilateral Shoulder ER             Ther Ex             UBE             Cervical Snag - ext/rot 10x each            Seated thoracic rotation             Wall slides - t spine ext emphasis             Seated T spine extension             Chin Tuck - Banded Resistance                                        Ther Activity                                       Gait Training                                       Modalities

## 2022-11-09 ENCOUNTER — PROBLEM (OUTPATIENT)
Dept: URBAN - METROPOLITAN AREA CLINIC 6 | Facility: CLINIC | Age: 77
End: 2022-11-09

## 2022-11-09 DIAGNOSIS — H02.885: ICD-10-CM

## 2022-11-09 DIAGNOSIS — Z96.1: ICD-10-CM

## 2022-11-09 DIAGNOSIS — Z98.890: ICD-10-CM

## 2022-11-09 DIAGNOSIS — H02.882: ICD-10-CM

## 2022-11-09 PROCEDURE — 92012 INTRM OPH EXAM EST PATIENT: CPT

## 2022-11-09 ASSESSMENT — VISUAL ACUITY
OS_CC: 20/30
OD_CC: 20/25

## 2022-11-09 ASSESSMENT — TONOMETRY
OS_IOP_MMHG: 13
OD_IOP_MMHG: 13

## 2022-11-15 ENCOUNTER — OFFICE VISIT (OUTPATIENT)
Dept: PHYSICAL THERAPY | Facility: REHABILITATION | Age: 77
End: 2022-11-15

## 2022-11-15 DIAGNOSIS — M54.2 CHRONIC NECK PAIN: Primary | ICD-10-CM

## 2022-11-15 DIAGNOSIS — M26.622 ARTHRALGIA OF LEFT TEMPOROMANDIBULAR JOINT: ICD-10-CM

## 2022-11-15 DIAGNOSIS — G89.29 CHRONIC JAW PAIN: ICD-10-CM

## 2022-11-15 DIAGNOSIS — G89.29 CHRONIC NECK PAIN: Primary | ICD-10-CM

## 2022-11-15 DIAGNOSIS — R68.84 CHRONIC JAW PAIN: ICD-10-CM

## 2022-11-15 NOTE — PROGRESS NOTES
Daily Note     Today's date: 11/15/2022  Patient name: Milton Wiley  : 1945  MRN: [id number]  Referring provider: Franky English MD  Dx:   Encounter Diagnosis     ICD-10-CM    1  Chronic neck pain  M54 2     G89 29    2  Chronic jaw pain  R68 84     G89 29    3  Arthralgia of left temporomandibular joint  M26 622                   Subjective: Felt relatively the same overall  Today was a busy day and was sitting at my desk all day at work  Objective: See treatment diary below      Assessment: Tolerated treatment well  Does continue to have similar symptom irritability present from IE  Tolerated progressions well today focusing on thoracic spine and cervical spine mobility as well as postural control  Patient would benefit from continued PT      Plan: Continue per plan of care        Precautions: History of TMJ pathology, History of headaches, current pacemaker, previous history of heart failure, left knee previous TKA, history of varicose veins, current thyroid nodules       Manuals 11/8 11/15           SOR SE SE           Lateral Glides Cervical Spine  R C2-C4 Gr IV SE           Thoracic Spine Mobilizations             C1 Mobilizations SE SE           Neuro Re-Ed             Controlled Opening TMJ 10x np           TMJ Isometrics             Chin Tucks 10x 2x10 supine           Scapular Retractions 10x 2x10           Rows             Shoulder Extensions             Bilateral Shoulder ER  Against wall with tuck 3x8 gtb           Ther Ex             UBE  5' F L1           Cervical Snag - ext/rot 10x each 10x each            Seated thoracic rotation             Wall slides - t spine ext emphasis  2x10           Seated T spine extension  2x10 w/ PT OP           Chin Tuck - Banded Resistance              Neck Circles                          Ther Activity                                       Gait Training                                       Modalities

## 2022-11-17 ENCOUNTER — OFFICE VISIT (OUTPATIENT)
Dept: PHYSICAL THERAPY | Facility: REHABILITATION | Age: 77
End: 2022-11-17

## 2022-11-17 DIAGNOSIS — M54.2 CHRONIC NECK PAIN: Primary | ICD-10-CM

## 2022-11-17 DIAGNOSIS — M26.622 ARTHRALGIA OF LEFT TEMPOROMANDIBULAR JOINT: ICD-10-CM

## 2022-11-17 DIAGNOSIS — G89.29 CHRONIC NECK PAIN: Primary | ICD-10-CM

## 2022-11-17 DIAGNOSIS — G89.29 CHRONIC JAW PAIN: ICD-10-CM

## 2022-11-17 DIAGNOSIS — R68.84 CHRONIC JAW PAIN: ICD-10-CM

## 2022-11-17 NOTE — PROGRESS NOTES
Daily Note     Today's date: 2022  Patient name: Laurence Rausch  : 1945  MRN: [id number]  Referring provider: Ebony Buerger, MD  Dx:   Encounter Diagnosis     ICD-10-CM    1  Chronic neck pain  M54 2     G89 29       2  Chronic jaw pain  R68 84     G89 29       3  Arthralgia of left temporomandibular joint  M26 622                      Subjective: Had trouble today with work, and was in a lot of pain  More so on my right side  Objective: See treatment diary below      Assessment: Tolerated treatment well  Did have some higher irritability today after a long day of work  Did educate on stress reduction techniques today  Moderate fatigue post treatment  Patient demonstrated fatigue post treatment      Plan: Continue per plan of care        Precautions: History of TMJ pathology, History of headaches, current pacemaker, previous history of heart failure, left knee previous TKA, history of varicose veins, current thyroid nodules       Manuals 11/8 11/15 11/17          SOR SE SE SE          Lateral Glides Cervical Spine  R C2-C4 Gr IV SE R C2-C4 Gr IV SE          Thoracic Spine Mobilizations             C1 Mobilizations SE SE SE          Neuro Re-Ed             Controlled Opening TMJ 10x np           TMJ Isometrics             Chin Tucks 10x 2x10 supine 2x10 supine          Scapular Retractions 10x 2x10 2x10          Rows             Shoulder Extensions             Bilateral Shoulder ER  Against wall with tuck 3x8 gtb 3x8 gtb          Ther Ex             UBE  5' F L1 5' F L1          Cervical Snag - ext/rot 10x each 10x each  10x each          Seated thoracic rotation             Wall slides - t spine ext emphasis  2x10 2x10          Seated T spine extension  2x10 w/ PT OP 2x10 w/ PT OP          Chin Tuck - Banded Resistance              Neck Circles   1' B ways                       Ther Activity                                       Gait Training                                       Modalities

## 2022-11-21 ENCOUNTER — APPOINTMENT (OUTPATIENT)
Dept: PHYSICAL THERAPY | Facility: REHABILITATION | Age: 77
End: 2022-11-21

## 2022-11-25 ENCOUNTER — OFFICE VISIT (OUTPATIENT)
Dept: PHYSICAL THERAPY | Facility: REHABILITATION | Age: 77
End: 2022-11-25

## 2022-11-25 DIAGNOSIS — M54.2 CHRONIC NECK PAIN: Primary | ICD-10-CM

## 2022-11-25 DIAGNOSIS — R68.84 CHRONIC JAW PAIN: ICD-10-CM

## 2022-11-25 DIAGNOSIS — M26.622 ARTHRALGIA OF LEFT TEMPOROMANDIBULAR JOINT: ICD-10-CM

## 2022-11-25 DIAGNOSIS — G89.29 CHRONIC NECK PAIN: Primary | ICD-10-CM

## 2022-11-25 DIAGNOSIS — G89.29 CHRONIC JAW PAIN: ICD-10-CM

## 2022-11-25 NOTE — PROGRESS NOTES
Daily Note     Today's date: 2022  Patient name: Niki Helm  : 1945  MRN: [id number]  Referring provider: Raghav Chandler MD  Dx:   Encounter Diagnosis     ICD-10-CM    1  Chronic neck pain  M54 2     G89 29       2  Chronic jaw pain  R68 84     G89 29       3  Arthralgia of left temporomandibular joint  M26 622                      Subjective: Pt  reports no change in status upon arrival       Objective: See treatment diary below      Assessment: Tolerated treatment well  Patient would benefit from continued PT   Pt  able to complete all exercises with no increase in pain during or after session  Pt demonstrated good mobility throughout session  Pt  1:1 with PTA for entirety  Plan: Continue per plan of care        Precautions: History of TMJ pathology, History of headaches, current pacemaker, previous history of heart failure, left knee previous TKA, history of varicose veins, current thyroid nodules       Manuals 11/8 11/15 11/17 11/25         SOR SE SE SE KP 8'         Lateral Glides Cervical Spine  R C2-C4 Gr IV SE R C2-C4 Gr IV SE          Thoracic Spine Mobilizations             C1 Mobilizations SE SE SE          Neuro Re-Ed             Controlled Opening TMJ 10x np           TMJ Isometrics             Chin Tucks 10x 2x10 supine 2x10 supine 2x10 supine         Scapular Retractions 10x 2x10 2x10 20x         Rows             Shoulder Extensions             Bilateral Shoulder ER  Against wall with tuck 3x8 gtb 3x8 gtb 3x10 gtb         Ther Ex             UBE  5' F L1 5' F L1 5' F L1         Cervical Snag - ext/rot 10x each 10x each  10x each 10x ea         Seated thoracic rotation             Wall slides - t spine ext emphasis  2x10 2x10 2x10         Seated T spine extension  2x10 w/ PT OP 2x10 w/ PT OP 2x10 w/pta op         Chin Tuck - Banded Resistance              Neck Circles   1' B ways                       Ther Activity                                       Gait Training Modalities

## 2022-11-28 ENCOUNTER — OFFICE VISIT (OUTPATIENT)
Dept: PHYSICAL THERAPY | Facility: REHABILITATION | Age: 77
End: 2022-11-28

## 2022-11-28 DIAGNOSIS — G89.29 CHRONIC JAW PAIN: ICD-10-CM

## 2022-11-28 DIAGNOSIS — G89.29 CHRONIC NECK PAIN: Primary | ICD-10-CM

## 2022-11-28 DIAGNOSIS — R68.84 CHRONIC JAW PAIN: ICD-10-CM

## 2022-11-28 DIAGNOSIS — M26.622 ARTHRALGIA OF LEFT TEMPOROMANDIBULAR JOINT: ICD-10-CM

## 2022-11-28 DIAGNOSIS — M54.2 CHRONIC NECK PAIN: Primary | ICD-10-CM

## 2022-11-28 NOTE — PROGRESS NOTES
Daily Note     Today's date: 2022  Patient name: Aneesh Howell  : 1945  MRN: [id number]  Referring provider: Ami Linares MD  Dx:   Encounter Diagnosis     ICD-10-CM    1  Chronic neck pain  M54 2     G89 29       2  Chronic jaw pain  R68 84     G89 29       3  Arthralgia of left temporomandibular joint  M26 622                      Subjective: Have been feeling much better since I have had some extended time off of work  Objective: See treatment diary below      Assessment: Tolerated treatment well  More tolerant to treatment today with less irritability  Patient would benefit from continued PT      Plan: Continue per plan of care        Precautions: History of TMJ pathology, History of headaches, current pacemaker, previous history of heart failure, left knee previous TKA, history of varicose veins, current thyroid nodules       Manuals 11/8 11/15 11/17 11/25 11/28        SOR SE SE SE KP 8' SE 8'         Lateral Glides Cervical Spine  R C2-C4 Gr IV SE R C2-C4 Gr IV SE          Thoracic Spine Mobilizations             C1 Mobilizations SE SE SE          Neuro Re-Ed             Controlled Opening TMJ 10x np           TMJ Isometrics             Chin Tucks 10x 2x10 supine 2x10 supine 2x10 supine 3x8 seated        Scapular Retractions 10x 2x10 2x10 20x HEP        Rows     3x8 btb        Shoulder Extensions     3x8 btb        Bilateral Shoulder ER  Against wall with tuck 3x8 gtb 3x8 gtb 3x10 gtb 3x10 gtb        Ther Ex             UBE  5' F L1 5' F L1 5' F L1 5' F L2        Cervical Snag - ext/rot 10x each 10x each  10x each 10x ea 10x each        Seated thoracic rotation     10x each way         Wall slides - t spine ext emphasis  2x10 2x10 2x10 2x10         Seated T spine extension  2x10 w/ PT OP 2x10 w/ PT OP 2x10 w/pta op 2x10        Chin Tuck - Banded Resistance              Neck Circles   1' B ways  1' B ways                     Ther Activity                                       Gait Training                                       Modalities

## 2022-12-01 ENCOUNTER — OFFICE VISIT (OUTPATIENT)
Dept: PHYSICAL THERAPY | Facility: REHABILITATION | Age: 77
End: 2022-12-01

## 2022-12-01 DIAGNOSIS — G89.29 CHRONIC NECK PAIN: Primary | ICD-10-CM

## 2022-12-01 DIAGNOSIS — M54.2 CHRONIC NECK PAIN: Primary | ICD-10-CM

## 2022-12-01 DIAGNOSIS — R68.84 CHRONIC JAW PAIN: ICD-10-CM

## 2022-12-01 DIAGNOSIS — M26.622 ARTHRALGIA OF LEFT TEMPOROMANDIBULAR JOINT: ICD-10-CM

## 2022-12-01 DIAGNOSIS — G89.29 CHRONIC JAW PAIN: ICD-10-CM

## 2022-12-01 NOTE — PROGRESS NOTES
Daily Note     Today's date: 2022  Patient name: Alessandra Jordan  : 1945  MRN: [id number]  Referring provider: William Liao MD  Dx:   Encounter Diagnosis     ICD-10-CM    1  Chronic neck pain  M54 2     G89 29       2  Chronic jaw pain  R68 84     G89 29       3  Arthralgia of left temporomandibular joint  M26 622           Start Time: 1700  Stop Time: 1745  Total time in clinic (min): 45 minutes    Subjective: Pt reports 10/10 R-sided neck pain at the start of treatment that was exacerbated by her workday  Pt reports the repetitive action of using a hole punch and moving files around her desk causes pain in her upper R trap  Pt denies TMJ pain  Objective: See treatment diary below      Assessment: Tolerated treatment well with notable decrease in pain from 10/10 to 5/10 by end of treatment  Patient would benefit from continued PT in order to increase muscular endurance to avoid flare ups after a workday  1:1 with Sandeep Alfaro, SPT under direct supervision of Salma Page DPT for entirety of tx  Plan: Continue per plan of care        Precautions: History of TMJ pathology, History of headaches, current pacemaker, previous history of heart failure, left knee previous TKA, history of varicose veins, current thyroid nodules       Manuals 11/8 11/15 11/17 11/25 11/28 12/1       SOR SE SE SE KP 8' SE 8'  JS 8'       Lateral Glides Cervical Spine  R C2-C4 Gr IV SE R C2-C4 Gr IV SE          Thoracic Spine Mobilizations             C1 Mobilizations SE SE SE          Neuro Re-Ed             Controlled Opening TMJ 10x np           TMJ Isometrics             Chin Tucks 10x 2x10 supine 2x10 supine 2x10 supine 3x8 seated 2x10 supine       Scapular Retractions 10x 2x10 2x10 20x HEP        Rows     3x8 btb 3x8 btb       Shoulder Extensions     3x8 btb 3x8 btb       Bilateral Shoulder ER  Against wall with tuck 3x8 gtb 3x8 gtb 3x10 gtb 3x10 gtb 3x10 gtb       Ther Ex             UBE  5' F L1 5' F L1 5' F L1 5' F L2 5' F L2       Cervical Snag - ext/rot 10x each 10x each  10x each 10x ea 10x each        Seated thoracic rotation     10x each way         Wall slides - t spine ext emphasis  2x10 2x10 2x10 2x10         Seated T spine extension  2x10 w/ PT OP 2x10 w/ PT OP 2x10 w/pta op 2x10 2x10       Chin Tuck - Banded Resistance              Neck Circles   1' B ways  1' B ways 1' B ways       Shrugs with c/s rot      10x 3#       Ther Activity                                       Gait Training                                       Modalities

## 2022-12-08 ENCOUNTER — OFFICE VISIT (OUTPATIENT)
Dept: PHYSICAL THERAPY | Facility: REHABILITATION | Age: 77
End: 2022-12-08

## 2022-12-08 DIAGNOSIS — R68.84 CHRONIC JAW PAIN: ICD-10-CM

## 2022-12-08 DIAGNOSIS — G89.29 CHRONIC JAW PAIN: ICD-10-CM

## 2022-12-08 DIAGNOSIS — M26.622 ARTHRALGIA OF LEFT TEMPOROMANDIBULAR JOINT: ICD-10-CM

## 2022-12-08 DIAGNOSIS — G89.29 CHRONIC NECK PAIN: Primary | ICD-10-CM

## 2022-12-08 DIAGNOSIS — M54.2 CHRONIC NECK PAIN: Primary | ICD-10-CM

## 2022-12-08 NOTE — PROGRESS NOTES
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 without change in position unless she needs to stand to use the [employer]  Objective: See treatment diary below      Assessment: Tolerated addition of serratus exercises well, with mild complaint of R UT pain with resisted shoulder flexion  She improved her form for serratus wall slides after verbal and tactile cueing  Pt demonstrates postural awareness as she states she doesn't want to develop a Dowager's hump like her mother and was receptive to pt ed regarding completion of cervical retraction exercises every hour at work  Patient would benefit from continued PT in order to strengthen serratus and LT in order to create better scapular balance with prolonged work-related activities  Plan: Continue per plan of care  Progress treatment as tolerated  Continue to add exercises to emphasize serratus and LT, as pt tolerates       Precautions: History of TMJ pathology, History of headaches, current pacemaker, previous history of heart failure, left knee previous TKA, history of varicose veins, current thyroid nodules       Manuals 11/8 11/15 11/17 11/25 11/28 12/1 12/8      SOR SE SE SE KP 8' SE 8'  JS 8' JS 8' with cervical retraction      Lateral Glides Cervical Spine  R C2-C4 Gr IV SE R C2-C4 Gr IV SE          Thoracic Spine Mobilizations             C1 Mobilizations SE SE SE          Neuro Re-Ed             Controlled Opening TMJ 10x np TMJ Isometrics             Chin Tucks 10x 2x10 supine 2x10 supine 2x10 supine 3x8 seated 2x10 supine 10x      Scapular Retractions 10x 2x10 2x10 20x HEP        Shoulder rolls       Backwards 10x      Resisted b/l shoulder flex, thumb up with serratus        2x10 ytb under R foot, flex to shoulder height      Rows     3x8 btb 3x8 btb       Shoulder Extensions     3x8 btb 3x8 btb       Bilateral Shoulder ER  Against wall with tuck 3x8 gtb 3x8 gtb 3x10 gtb 3x10 gtb 3x10 gtb       Ther Ex             Pt ed       10'      UBE  5' F L1 5' F L1 5' F L1 5' F L2 5' F L2 5' F L2      Cervical Snag - ext/rot 10x each 10x each  10x each 10x ea 10x each        Seated thoracic rotation     10x each way         Wall slides - t spine ext emphasis  2x10 2x10 2x10 2x10   3x10 serratus      Seated T spine extension  2x10 w/ PT OP 2x10 w/ PT OP 2x10 w/pta op 2x10 2x10       Chin Tuck - Banded Resistance              Neck Circles   1' B ways  1' B ways 1' B ways       Shrugs with c/s rot      10x 3#       Pec stretch over foam roll       2x30"      Ther Activity                                       Gait Training                                       Modalities

## 2022-12-12 ENCOUNTER — ANNUAL EXAM (OUTPATIENT)
Dept: OBGYN CLINIC | Facility: CLINIC | Age: 77
End: 2022-12-12

## 2022-12-12 VITALS
SYSTOLIC BLOOD PRESSURE: 90 MMHG | BODY MASS INDEX: 33.79 KG/M2 | WEIGHT: 183.6 LBS | DIASTOLIC BLOOD PRESSURE: 60 MMHG | HEIGHT: 62 IN

## 2022-12-12 DIAGNOSIS — Z01.419 WOMEN'S ANNUAL ROUTINE GYNECOLOGICAL EXAMINATION: Primary | ICD-10-CM

## 2022-12-12 DIAGNOSIS — Z12.31 ENCOUNTER FOR SCREENING MAMMOGRAM FOR MALIGNANT NEOPLASM OF BREAST: ICD-10-CM

## 2022-12-12 NOTE — PATIENT INSTRUCTIONS
Patient was informed of a stable menopausal GYN examination  She still having some pain in her left knee  She may get a second opinion  She is content with her weight  She feels safe at home  She sees a dentist on a regular basis  Colonoscopy up-to-date  She still working full-time  She should return to my office in 1 year unless new issues occur

## 2022-12-12 NOTE — PROGRESS NOTES
Assessment/Plan:    The patient was informed of a stable menopausal GYN examination  Pap smear was not performed  The vaginal vault is well supported without evidence of prolapse  Continue seeing a dentist on a regular basis  She will continue on her colonoscopies and mammograms as directed by primary care physician  She will keep an eye on her heart disease  She should return to my office in 1 to 2 years unless new issues occur  Subjective:      Patient ID: Luis Crandall is a 78 y o  female  HPI    This is a 29-year-old white female, she is a  3 para 3 with 3 prior vaginal deliveries  She has a history of a hysterectomy many years ago  Currently no longer sexually active because of her 's health issues  Menopause symptoms under control  She feels safe at home  She is a dentist on a regular basis  Does have a slight problem occasional constipation  Slight stress urinary continence  She has history of a vaginal sling which is working well for her  Since her last visit she had replacement of the left knee  She may need replacement surgery on the right knee soon  There are no new major family illnesses to report  Currently she is being evaluated for heart disease  She is still working full-time  Colonoscopies and mammograms up-to-date  She is content with her weight  Denies any prior depression or anxiety  Occasional leakage of urine vagina from the last   Her  has been diagnosed with a facial cancer he is doing better  The following portions of the patient's history were reviewed and updated as appropriate: allergies, current medications, past family history, past medical history, past social history, past surgical history and problem list     Review of Systems   HENT: Negative for sinus pain  All other systems reviewed and are negative          Objective:      BP 90/60   Ht 5' 2" (1 575 m)   Wt 83 3 kg (183 lb 9 6 oz)   BMI 33 58 kg/m² Physical Exam  Vitals reviewed  Exam conducted with a chaperone present  Constitutional:       Appearance: Normal appearance  HENT:      Head: Normocephalic and atraumatic  Nose: Nose normal       Mouth/Throat:      Mouth: Mucous membranes are moist    Eyes:      Extraocular Movements: Extraocular movements intact  Pupils: Pupils are equal, round, and reactive to light  Cardiovascular:      Rate and Rhythm: Normal rate and regular rhythm  Pulses: Normal pulses  Heart sounds: Normal heart sounds  Pulmonary:      Effort: Pulmonary effort is normal       Breath sounds: Normal breath sounds  Chest:   Breasts:     Breasts are symmetrical       Right: Normal  No swelling, bleeding, inverted nipple, mass, nipple discharge, skin change or tenderness  Left: Normal  No swelling, bleeding, inverted nipple, mass, nipple discharge, skin change or tenderness  Abdominal:      General: Abdomen is flat  Bowel sounds are normal  There is no distension  Palpations: Abdomen is soft  There is no hepatomegaly, splenomegaly or mass  Tenderness: There is no abdominal tenderness  There is no right CVA tenderness, guarding or rebound  Hernia: No hernia is present  There is no hernia in the umbilical area, ventral area, left inguinal area or right inguinal area  Genitourinary:     General: Normal vulva  Pubic Area: No rash or pubic lice  Labia:         Right: No rash, tenderness, lesion or injury  Left: No rash, tenderness, lesion or injury  Urethra: No prolapse, urethral pain, urethral swelling or urethral lesion  Vagina: Normal  No signs of injury and foreign body  No vaginal discharge, erythema, tenderness, bleeding, lesions or prolapsed vaginal walls  Uterus: Absent  Adnexa: Right adnexa normal and left adnexa normal         Right: No mass, tenderness or fullness  Left: No mass, tenderness or fullness          Rectum: Normal  Comments: External genitalia normal limits the vagina is clean discharge uterus is surgically removed  The vaginal vault is well supported with no evidence of prolapse  Pap smear was not performed  The adnexa clear bilaterally  Urethra and bladder normal working relationship  Musculoskeletal:         General: Normal range of motion  Cervical back: Normal range of motion and neck supple  Lymphadenopathy:      Upper Body:      Right upper body: No supraclavicular adenopathy  Left upper body: No supraclavicular adenopathy  Lower Body: No right inguinal adenopathy  No left inguinal adenopathy  Skin:     General: Skin is warm and dry  Neurological:      General: No focal deficit present  Mental Status: She is alert and oriented to person, place, and time  Psychiatric:         Mood and Affect: Mood normal          Behavior: Behavior normal          Thought Content:  Thought content normal

## 2022-12-15 ENCOUNTER — APPOINTMENT (OUTPATIENT)
Dept: PHYSICAL THERAPY | Facility: REHABILITATION | Age: 77
End: 2022-12-15

## 2023-01-11 ENCOUNTER — TELEPHONE (OUTPATIENT)
Dept: HEMATOLOGY ONCOLOGY | Facility: HOSPITAL | Age: 78
End: 2023-01-11

## 2023-01-11 ENCOUNTER — TELEPHONE (OUTPATIENT)
Dept: HEMATOLOGY ONCOLOGY | Facility: CLINIC | Age: 78
End: 2023-01-11

## 2023-01-11 NOTE — TELEPHONE ENCOUNTER
Xander Oconnor left a message on nurse Radha's voicemail recommending a call back regarding her labs she just got done  I did call her back  These labs were ordered by Dr Martin Zaman and were drawn at [facility] on Sat 1/7  I called [facility] to have them fax the results to our RightFax number  Patient stated that she is feeling miserable due to pain in her neck, back, and hip  She is taking acetaminophen daily without much relief  She is also getting PT for chronic neck pain  She has an appt scheduled for early February   She would like a call back from Dr Tawana Mon in the meantime because her test results were "higher than before "

## 2023-01-11 NOTE — TELEPHONE ENCOUNTER
Scheduling Appointment SEND TO [facility]    Who Is Calling to Schedule Patient    Doctor Dr Yael Mae   Date and Time 2/10/23   Reason for scheduling appointment Needs follow up as her bllod results were in and says its not too good   Patient verbalized understanding    Yes

## 2023-01-12 ENCOUNTER — TELEPHONE (OUTPATIENT)
Dept: HEMATOLOGY ONCOLOGY | Facility: CLINIC | Age: 78
End: 2023-01-12

## 2023-01-13 ENCOUNTER — TELEPHONE (OUTPATIENT)
Dept: HEMATOLOGY ONCOLOGY | Facility: CLINIC | Age: 78
End: 2023-01-13

## 2023-01-13 NOTE — TELEPHONE ENCOUNTER
Called patient to let her know that Dr Guanako Zaman reviewed her lab results from 1/7/23  Per Dr Guanako Zaman, nothing significant to report and will follow up with her at her next scheduled appointment on 2/10/23  Patient verbalized understanding

## 2023-01-16 ENCOUNTER — TELEPHONE (OUTPATIENT)
Dept: HEMATOLOGY ONCOLOGY | Facility: HOSPITAL | Age: 78
End: 2023-01-16

## 2023-01-16 NOTE — TELEPHONE ENCOUNTER
Called patient to reschedule her appt with [facility] on 2/10  We were able to reschedule with [facility] on 2/9

## 2023-01-18 ENCOUNTER — EVALUATION (OUTPATIENT)
Dept: PHYSICAL THERAPY | Facility: REHABILITATION | Age: 78
End: 2023-01-18

## 2023-01-18 DIAGNOSIS — G89.29 CHRONIC NECK PAIN: ICD-10-CM

## 2023-01-18 DIAGNOSIS — G89.29 CHRONIC JAW PAIN: ICD-10-CM

## 2023-01-18 DIAGNOSIS — M54.2 CHRONIC NECK PAIN: ICD-10-CM

## 2023-01-18 DIAGNOSIS — M54.50 CHRONIC BILATERAL LOW BACK PAIN, UNSPECIFIED WHETHER SCIATICA PRESENT: Primary | ICD-10-CM

## 2023-01-18 DIAGNOSIS — G89.29 CHRONIC BILATERAL LOW BACK PAIN, UNSPECIFIED WHETHER SCIATICA PRESENT: Primary | ICD-10-CM

## 2023-01-18 DIAGNOSIS — R68.84 CHRONIC JAW PAIN: ICD-10-CM

## 2023-01-18 NOTE — PROGRESS NOTES
PT Re-Evaluation     Today's date: 2023  Patient name: Jael Chaidez  : 1945  MRN: [id number]  Referring provider: Jazmine Lowe MD  Dx:   Encounter Diagnosis     ICD-10-CM    1  Chronic bilateral low back pain, unspecified whether sciatica present  M54 50     G89 29       2  Chronic neck pain  M54 2     G89 29       3  Chronic jaw pain  R68 84     G89 29                      Subjective:  I went and saw the rheumatologist yesterday, who suggested a medication for my knees  I also went to the dentist for a consult and am getting a night brace to help with my jaw pain  Also saw a pain specialist today following up with my knee and back  Been having a lot of pain from my neck down to my knees  I am going to get a second opinion from a surgeon for my knee that was previously operated on, as it has still been giving me issues over the past 2 5 years  In regards to my lower back, this has been a problem for me for many years  Medications and injections have not seemed to help  It has gotten exacerbated since  for some reason, not sure why  I do feel that it is related to my left knee and hip  It seems to bother me if I sit for a long period of time, but also when I am walking  Standing still for a period of time will also reproduce the pain  Patient Goals: Being able to walk further (the block up and down by my home), being able to do more around the home, improve motion, improve strength, improve function       Pain rating:   Low back: 5/10 (current), 8/10 (worst)  Neck: 8/10 (current), 10/10 (worst)        Objective: See treatment diary below     Gait: Antalgic; left lateral trunk lean     Posture: Decreased lumbar lordosis, increased thoracic kyphosis     Lumbar AROM:   Flexion: 25% limitation  Extension: 50% limitation  Sideglide: 25% limitation B     Lower Quarter Screen: Unremarkable     Strength:   Knee Extension: R 4+/5  L 4-/5  Knee Flexion: R 5/5  L 4/5   Hip Abduction: R 3+/5 L 3/5   Hip Extension Knee Extended: R 3/5   L 3/5     Palpation: (+) Left anterior knee superior to patella and medial joint line     Passive SLR: (-) B   Active SLR: Unable B due to weakness      Cervical/Thoracic Comments  Cervical Spine AROM:   Flexion: 25% limited   Extension: 50% limited  Rotation: R 25% limited  L 50% limited  Sidebending: R 50% limited  L 50% limited    Cervical Spine PROM:   Flexion: WNL   Rotation: R 15% limited L 50% limited  Sidebending: R 25% limited L 50% limited    Palpation:   (+) C1 Bilaterally   (+) Temporalis Bilaterally   (+) CTJ   (+) Suboccipital musculature B    Posture: Forward Head  Rounded Shoulders     DNF Endurance: 5 seconds    Upper Cervical Rotation Test:   (+) Left    Thoracic Spine AROM:   Rotation: R 25% limited  L 25% limited  Extension: 50% limited    Joint Play Cervical Spine: Hypomobile       Assessment: Star Keith is back in physical therapy after missing the past month due to the holidays and personal reasons  She presents with similar deficits related to the TMJ and cervical spine, including poor postural awareness, poor motor control, decreased functional strength, poor thoracic mobility, abnormal movement coordination, and limited mobility, all of which are limiting her ability to perform tasks 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, sit or stand for prolonged periods, yawn, chew, eat comfortably, and perform other ADLs  She also was referred by her rheumatologist for low back pain which the patient has been experiencing for several years  Her movement exam was remarkable for impairments related to range of motion, movement coordination, and strength deficits, more present on the left side, and evident by her left sided lateral trunk lean with weight bearing and gait especially       At this time, the patient would benefit from skilled physical therapy both for her neck/jaw related impairments as well as her low back related impairments to address her aforementioned impairments, improve her quality of life, and restore her PLOF  Goals  Impairment Based Goals:   Patient will have a decrease in pain by at least 50% in 4 weeks  Patient will improve FOTO greater than predicted increase by discharge  Patient will improve cervical spine AROM by at least 25% in 4 weeks  Patient will improve DNF endurance by at least 5 seconds in 5 weeks  Functional Based Goals: To be met upon discharge  Patient will be independent with home exercise program    Patient will be able to manage symptoms independently  Patient will be independent with work related activities at the [address] [place] [address] admissions at  Patient will be independent with household ADLs  Patient will be independent with mouth opening and eating without limitations due to jaw pain  Plan: 2x/week for the next 6 weeks focusing on restoring aforementioned impairments        Precautions: History of TMJ pathology, History of headaches, current pacemaker, previous history of heart failure, left knee previous TKA, history of varicose veins, current thyroid nodules       Manuals 1/18            SOR             Lateral Glides Cervical Spine             Thoracic Spine Mobilizations             C1 Mobilizations             Neuro Re-Ed             Controlled Opening TMJ             TMJ Isometrics             Chin Tucks             Scapular Retractions             Shoulder rolls             Resisted b/l shoulder flex, thumb up with serratus              Rows             Shoulder Extensions             Bilateral Shoulder ER             Ther Ex             Pt ed 25'            UBE             Cervical Snag - ext/rot             Seated thoracic rotation             Wall slides - t spine ext emphasis             Seated T spine extension             Chin Tuck - Banded Resistance              Neck Circles             Shrugs with c/s rot             Pec stretch over foam roll Ther Activity                                       Gait Training                                       Modalities

## 2023-01-25 ENCOUNTER — OFFICE VISIT (OUTPATIENT)
Dept: PHYSICAL THERAPY | Facility: REHABILITATION | Age: 78
End: 2023-01-25

## 2023-01-25 DIAGNOSIS — R68.84 CHRONIC JAW PAIN: ICD-10-CM

## 2023-01-25 DIAGNOSIS — G89.29 CHRONIC NECK PAIN: ICD-10-CM

## 2023-01-25 DIAGNOSIS — M54.50 CHRONIC BILATERAL LOW BACK PAIN, UNSPECIFIED WHETHER SCIATICA PRESENT: Primary | ICD-10-CM

## 2023-01-25 DIAGNOSIS — M54.2 CHRONIC NECK PAIN: ICD-10-CM

## 2023-01-25 DIAGNOSIS — G89.29 CHRONIC JAW PAIN: ICD-10-CM

## 2023-01-25 DIAGNOSIS — G89.29 CHRONIC BILATERAL LOW BACK PAIN, UNSPECIFIED WHETHER SCIATICA PRESENT: Primary | ICD-10-CM

## 2023-01-25 NOTE — PROGRESS NOTES
Daily Note     Today's date: 2023  Patient name: Ivette Calzada  : 1945  MRN: [id number]  Referring provider: Katie Lua MD  Dx:   Encounter Diagnosis     ICD-10-CM    1  Chronic bilateral low back pain, unspecified whether sciatica present  M54 50     G89 29       2  Chronic neck pain  M54 2     G89 29       3  Chronic jaw pain  R68 84     G89 29                      Subjective: Tried some of the exercises at home but they were challenging  Could only do one set  Haven't done them since that one time  Objective: See treatment diary below      Assessment: Tolerated treatment well  Responded well to initial exercises today  Was challenged with standing hip abduction more so when moving right lower extremity  Patient would benefit from continued PT      Plan: Continue per plan of care        Precautions: History of TMJ pathology, History of headaches, current pacemaker, previous history of heart failure, left knee previous TKA, history of varicose veins, current thyroid nodules       Manuals            SOR             Lateral Glides Cervical Spine             Thoracic Spine Mobilizations             C1 Mobilizations             Neuro Re-Ed             Controlled Opening TMJ             TMJ Isometrics             Chin Tucks             Single arm Row  btb 2x12 B           Standing Chop  4 5# 2x12 B           Resisted b/l shoulder flex, thumb up with serratus              Rows             Shoulder Extensions             Bilateral Shoulder ER             Ther Ex             Pt ed 25'            NuStep  8' L5           Cervical Snag - ext/rot             Standing hip Abduction  rtb 3x8            Standing pball press - arm raise  2x15            Lumbar Pball roll   8x5"           Chin Tuck - Banded Resistance              Neck Circles             Shrugs with c/s rot             Pec stretch over foam roll             Ther Activity                                       Gait Training Modalities

## 2023-01-26 ENCOUNTER — OFFICE VISIT (OUTPATIENT)
Dept: PHYSICAL THERAPY | Facility: REHABILITATION | Age: 78
End: 2023-01-26

## 2023-01-26 DIAGNOSIS — G89.29 CHRONIC NECK PAIN: ICD-10-CM

## 2023-01-26 DIAGNOSIS — M54.2 CHRONIC NECK PAIN: ICD-10-CM

## 2023-01-26 DIAGNOSIS — R68.84 CHRONIC JAW PAIN: ICD-10-CM

## 2023-01-26 DIAGNOSIS — M54.50 CHRONIC BILATERAL LOW BACK PAIN, UNSPECIFIED WHETHER SCIATICA PRESENT: Primary | ICD-10-CM

## 2023-01-26 DIAGNOSIS — G89.29 CHRONIC JAW PAIN: ICD-10-CM

## 2023-01-26 DIAGNOSIS — G89.29 CHRONIC BILATERAL LOW BACK PAIN, UNSPECIFIED WHETHER SCIATICA PRESENT: Primary | ICD-10-CM

## 2023-01-26 NOTE — PROGRESS NOTES
Daily Note     Today's date: 2023  Patient name: Jael Chaidez  : 1945  MRN: [id number]  Referring provider: Jazmine Lowe MD  Dx:   Encounter Diagnosis     ICD-10-CM    1  Chronic bilateral low back pain, unspecified whether sciatica present  M54 50     G89 29       2  Chronic neck pain  M54 2     G89 29       3  Chronic jaw pain  R68 84     G89 29                      Subjective: Feeling a bit sore after yesterday but not too bad  Knee was bothering me a bit today, and interested with a second opinion with a physician  Objective: See treatment diary below      Assessment: Tolerated treatment well  Resumed activity well from yesterday's session  Did give patient contact information for orthopedic within network for second opinion regarding her right knee symptoms  Patient would benefit from continued PT      Plan: Continue per plan of care        Precautions: History of TMJ pathology, History of headaches, current pacemaker, previous history of heart failure, left knee previous TKA, history of varicose veins, current thyroid nodules       Manuals           SOR             Lateral Glides Cervical Spine             Thoracic Spine Mobilizations             C1 Mobilizations             Neuro Re-Ed             Controlled Opening TMJ             TMJ Isometrics             Chin Tucks             Single arm Row  btb 2x12 B btb 2x12 B          Standing Chop  4 5# 2x12 B 4 5# 2x12 B          Resisted b/l shoulder flex, thumb up with serratus              Rows             Shoulder Extensions             Bilateral Shoulder ER             Ther Ex             Pt ed 25'            NuStep  8' L5 10' L5          Cervical Snag - ext/rot             Standing hip Abduction  rtb 3x8  rtb 3x8          Standing pball press - arm raise  2x15  2x15          Lumbar Pball roll   8x5" 8x5"          Chin Tuck - Banded Resistance              Neck Circles             Shrugs with c/s rot             Pec stretch over foam roll             Ther Activity                                       Gait Training                                       Modalities

## 2023-01-27 ENCOUNTER — TELEPHONE (OUTPATIENT)
Dept: OBGYN CLINIC | Facility: CLINIC | Age: 78
End: 2023-01-27

## 2023-01-27 ENCOUNTER — TELEPHONE (OUTPATIENT)
Dept: OBGYN CLINIC | Facility: HOSPITAL | Age: 78
End: 2023-01-27

## 2023-01-27 NOTE — TELEPHONE ENCOUNTER
Caller: Patient     Doctor: Brittney Rojas    Reason for call: Checking to see if we received the records via fax yet  She will check back later       Call back#: [phone]

## 2023-01-27 NOTE — TELEPHONE ENCOUNTER
Caller: Patient     Doctor: Dr Rosa     Reason for call: Patient having records sent for review to be seen with Dr Rosa       Will call once faced     Call back#: n/a

## 2023-02-01 ENCOUNTER — OFFICE VISIT (OUTPATIENT)
Dept: PHYSICAL THERAPY | Facility: REHABILITATION | Age: 78
End: 2023-02-01

## 2023-02-01 DIAGNOSIS — G89.29 CHRONIC BILATERAL LOW BACK PAIN, UNSPECIFIED WHETHER SCIATICA PRESENT: Primary | ICD-10-CM

## 2023-02-01 DIAGNOSIS — R68.84 CHRONIC JAW PAIN: ICD-10-CM

## 2023-02-01 DIAGNOSIS — M54.50 CHRONIC BILATERAL LOW BACK PAIN, UNSPECIFIED WHETHER SCIATICA PRESENT: Primary | ICD-10-CM

## 2023-02-01 DIAGNOSIS — M54.2 CHRONIC NECK PAIN: ICD-10-CM

## 2023-02-01 DIAGNOSIS — G89.29 CHRONIC NECK PAIN: ICD-10-CM

## 2023-02-01 DIAGNOSIS — G89.29 CHRONIC JAW PAIN: ICD-10-CM

## 2023-02-01 NOTE — PROGRESS NOTES
Daily Note     Today's date: 2023  Patient name: Sayda Pace  : 1945  MRN: [id number]  Referring provider: Haroon Corley MD  Dx:   Encounter Diagnosis     ICD-10-CM    1  Chronic bilateral low back pain, unspecified whether sciatica present  M54 50     G89 29       2  Chronic neck pain  M54 2     G89 29       3  Chronic jaw pain  R68 84     G89 29                      Subjective: Felt a bit better over the weekend than last week  I did have a minor fall in my closet, but I did not sustain any injuries  Objective: See treatment diary below      Assessment: Tolerated treatment well  Had positive response to today's treatment  Patient would benefit from continued PT      Plan: Continue per plan of care        Precautions: History of TMJ pathology, History of headaches, current pacemaker, previous history of heart failure, left knee previous TKA, history of varicose veins, current thyroid nodules       Manuals          SOR             Lateral Glides Cervical Spine             Thoracic Spine Mobilizations             C1 Mobilizations             Neuro Re-Ed             Controlled Opening TMJ             TMJ Isometrics             Chin Tucks             Single arm Row  btb 2x12 B btb 2x12 B btb 2x12 B         Standing Chop  4 5# 2x12 B 4 5# 2x12 B 4 5# 2x12 B         Resisted b/l shoulder flex, thumb up with serratus              Rows             Shoulder Extensions             Bilateral Shoulder ER             Ther Ex             Pt ed 25'            NuStep  8' L5 10' L5 10' L5         Cervical Snag - ext/rot             Standing hip Abduction  rtb 3x8  rtb 3x8 rtb 3x8         Standing pball press - arm raise  2x15  2x15 2x15          Lumbar Pball roll   8x5" 8x5" 8x5"         Chin Tuck - Banded Resistance              Neck Circles             Shrugs with c/s rot             Pec stretch over foam roll             Ther Activity                                       Gait Training Modalities

## 2023-02-02 ENCOUNTER — OFFICE VISIT (OUTPATIENT)
Dept: PHYSICAL THERAPY | Facility: REHABILITATION | Age: 78
End: 2023-02-02

## 2023-02-02 DIAGNOSIS — G89.29 CHRONIC BILATERAL LOW BACK PAIN, UNSPECIFIED WHETHER SCIATICA PRESENT: Primary | ICD-10-CM

## 2023-02-02 DIAGNOSIS — G89.29 CHRONIC JAW PAIN: ICD-10-CM

## 2023-02-02 DIAGNOSIS — G89.29 CHRONIC NECK PAIN: ICD-10-CM

## 2023-02-02 DIAGNOSIS — M54.2 CHRONIC NECK PAIN: ICD-10-CM

## 2023-02-02 DIAGNOSIS — R68.84 CHRONIC JAW PAIN: ICD-10-CM

## 2023-02-02 DIAGNOSIS — M54.50 CHRONIC BILATERAL LOW BACK PAIN, UNSPECIFIED WHETHER SCIATICA PRESENT: Primary | ICD-10-CM

## 2023-02-02 NOTE — PROGRESS NOTES
Daily Note     Today's date: 2023  Patient name: Nurys Paul  : 1945  MRN: [id number]  Referring provider: Luis Rowley MD  Dx:   Encounter Diagnosis     ICD-10-CM    1  Chronic bilateral low back pain, unspecified whether sciatica present  M54 50     G89 29       2  Chronic neck pain  M54 2     G89 29       3  Chronic jaw pain  R68 84     G89 29                      Subjective: Felt pretty good after yesterday's treatment  Objective: See treatment diary below      Assessment: Tolerated treatment well  Able to resume activity well from previous treatment session  Patient would benefit from continued PT      Plan: Continue per plan of care        Precautions: History of TMJ pathology, History of headaches, current pacemaker, previous history of heart failure, left knee previous TKA, history of varicose veins, current thyroid nodules       Manuals         SOR             Lateral Glides Cervical Spine             Thoracic Spine Mobilizations             C1 Mobilizations             Neuro Re-Ed             Controlled Opening TMJ             TMJ Isometrics             Chin Tucks             Single arm Row  btb 2x12 B btb 2x12 B btb 2x12 B btb 2x12 B        Standing Chop  4 5# 2x12 B 4 5# 2x12 B 4 5# 2x12 B 5# 2x12 B        Resisted b/l shoulder flex, thumb up with serratus              Rows             Shoulder Extensions             Bilateral Shoulder ER             Ther Ex             Pt ed 25'            NuStep  8' L5 10' L5 10' L5 10' L5        Cervical Snag - ext/rot             Standing hip Abduction  rtb 3x8  rtb 3x8 rtb 3x8 rtb 3x8        Standing pball press - arm raise  2x15  2x15 2x15  2x15        Lumbar Pball roll   8x5" 8x5" 8x5" 8x5"         Chin Tuck - Banded Resistance              Neck Circles             Shrugs with c/s rot             Pec stretch over foam roll             Ther Activity                                       Gait Training Modalities

## 2023-02-06 ENCOUNTER — APPOINTMENT (OUTPATIENT)
Dept: PHYSICAL THERAPY | Facility: REHABILITATION | Age: 78
End: 2023-02-06

## 2023-02-08 ENCOUNTER — OFFICE VISIT (OUTPATIENT)
Dept: PHYSICAL THERAPY | Facility: REHABILITATION | Age: 78
End: 2023-02-08

## 2023-02-08 DIAGNOSIS — G89.29 CHRONIC NECK PAIN: ICD-10-CM

## 2023-02-08 DIAGNOSIS — M54.50 CHRONIC BILATERAL LOW BACK PAIN, UNSPECIFIED WHETHER SCIATICA PRESENT: Primary | ICD-10-CM

## 2023-02-08 DIAGNOSIS — M54.2 CHRONIC NECK PAIN: ICD-10-CM

## 2023-02-08 DIAGNOSIS — G89.29 CHRONIC JAW PAIN: ICD-10-CM

## 2023-02-08 DIAGNOSIS — G89.29 CHRONIC BILATERAL LOW BACK PAIN, UNSPECIFIED WHETHER SCIATICA PRESENT: Primary | ICD-10-CM

## 2023-02-08 DIAGNOSIS — R68.84 CHRONIC JAW PAIN: ICD-10-CM

## 2023-02-08 NOTE — PROGRESS NOTES
Daily Note     Today's date: 2023  Patient name: Allan Lilly  : 1945  MRN: [id number]  Referring provider: Shanta Loomis MD  Dx:   Encounter Diagnosis     ICD-10-CM    1  Chronic bilateral low back pain, unspecified whether sciatica present  M54 50     G89 29       2  Chronic neck pain  M54 2     G89 29       3  Chronic jaw pain  R68 84     G89 29                      Subjective: The other day my knee was really sore, but then it went away  I have had a couple good days with my knees the last couple days  Objective: See treatment diary below      Assessment: Tolerated treatment well  Did respond to knee distraction mobility work post treatment today  Progressing well with exercises  Patient would benefit from continued PT      Plan: Continue per plan of care        Precautions: History of TMJ pathology, History of headaches, current pacemaker, previous history of heart failure, left knee previous TKA, history of varicose veins, current thyroid nodules       Manuals        SOR             Lateral Glides Cervical Spine             Thoracic Spine Mobilizations             Knee Distraction      SE 8' b       Neuro Re-Ed             Controlled Opening TMJ             TMJ Isometrics             Chin Tucks             Single arm Row  btb 2x12 B btb 2x12 B btb 2x12 B btb 2x12 B btb 2x12 B       Standing Chop  4 5# 2x12 B 4 5# 2x12 B 4 5# 2x12 B 5# 2x12 B 5# 2x12 B       Resisted b/l shoulder flex, thumb up with serratus              Rows             Shoulder Extensions             Bilateral Shoulder ER             Ther Ex             Pt ed 25'            NuStep  8' L5 10' L5 10' L5 10' L5 10' L5       Cervical Snag - ext/rot             Standing hip Abduction  rtb 3x8  rtb 3x8 rtb 3x8 rtb 3x8 rtb 3x8       Standing pball press - arm raise  2x15  2x15 2x15  2x15 2x15       Lumbar Pball roll   8x5" 8x5" 8x5" 8x5"  8x5"       Chin Tuck - Banded Resistance              Neck Circles             Shrugs with c/s rot             Pec stretch over foam roll             Ther Activity                                       Gait Training                                       Modalities

## 2023-02-09 ENCOUNTER — OFFICE VISIT (OUTPATIENT)
Dept: HEMATOLOGY ONCOLOGY | Facility: CLINIC | Age: 78
End: 2023-02-09

## 2023-02-09 VITALS
OXYGEN SATURATION: 96 % | HEIGHT: 62 IN | WEIGHT: 186 LBS | TEMPERATURE: 97.4 F | SYSTOLIC BLOOD PRESSURE: 126 MMHG | BODY MASS INDEX: 34.23 KG/M2 | DIASTOLIC BLOOD PRESSURE: 78 MMHG | HEART RATE: 70 BPM

## 2023-02-09 DIAGNOSIS — M79.7 FIBROMYALGIA: ICD-10-CM

## 2023-02-09 DIAGNOSIS — E53.8 VITAMIN B 12 DEFICIENCY: ICD-10-CM

## 2023-02-09 DIAGNOSIS — D47.2 MONOCLONAL GAMMOPATHY OF UNKNOWN SIGNIFICANCE (MGUS): ICD-10-CM

## 2023-02-09 DIAGNOSIS — D50.9 IRON DEFICIENCY ANEMIA, UNSPECIFIED IRON DEFICIENCY ANEMIA TYPE: ICD-10-CM

## 2023-02-09 DIAGNOSIS — E88.09 PLASMA CELL DYSCRASIA: Primary | ICD-10-CM

## 2023-02-09 RX ORDER — CLINDAMYCIN HYDROCHLORIDE 300 MG/1
CAPSULE ORAL
COMMUNITY

## 2023-02-09 RX ORDER — FLUOROURACIL 50 MG/G
CREAM TOPICAL
COMMUNITY
End: 2023-02-09 | Stop reason: ALTCHOICE

## 2023-02-09 RX ORDER — NIRMATRELVIR AND RITONAVIR 300-100 MG
KIT ORAL
COMMUNITY
End: 2023-02-09 | Stop reason: ALTCHOICE

## 2023-02-09 RX ORDER — TRAZODONE HYDROCHLORIDE 50 MG/1
TABLET ORAL
COMMUNITY
Start: 2023-02-09

## 2023-02-09 RX ORDER — POLYETHYLENE GLYCOL 3350 17 G/17G
POWDER, FOR SOLUTION ORAL
COMMUNITY
End: 2023-02-09 | Stop reason: ALTCHOICE

## 2023-02-09 RX ORDER — LOSARTAN POTASSIUM 100 MG/1
TABLET ORAL
COMMUNITY

## 2023-02-09 RX ORDER — IBUPROFEN 200 MG
TABLET ORAL
COMMUNITY

## 2023-02-09 RX ORDER — ERYTHROMYCIN 5 MG/G
OINTMENT OPHTHALMIC
COMMUNITY
End: 2023-02-09 | Stop reason: ALTCHOICE

## 2023-02-09 NOTE — PROGRESS NOTES
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  Most recent CBC from 1/7/2023 is within normal limits, Metabolic panel also stable creatinine 0 86, EGFR 70, normal calcium and protein levels  SPEP shows a faint band in the gamma region no M spike identified  Free light chains show an elevated kappa at 23 63, ratio was 1 88 no quantitative immunoglobulins obtained  Overall patient is able to function without restriction  She does report fatigue, feels like she gets infections easily, and reports feeling chilled easily  Patient has a history of low vitamin B12  We will request the following labs managing as indicated  We will see her in 6 months, patient verbalized understanding and is in agreement with the plan  - CBC and differential; Future  - Comprehensive metabolic panel; Future  - IgG, IgA, IgM; Future  - Immunoglobulin free LT chains blood; Future  - Protein electrophoresis, serum; Future  - Vitamin B12; Future  - Iron Panel (Includes Ferritin, Iron Sat%, Iron, and TIBC); Future    Barrier(s) to care: None  The patient is able to self care  [address] [facility]    Interval history: Clinically  stable    Review of Systems   Constitutional: Positive for fatigue   Negative for activity change, appetite change, fever and unexpected weight change  Respiratory: Negative for cough and shortness of breath  Cardiovascular: Negative for chest pain and leg swelling  Gastrointestinal: Negative for abdominal pain, constipation, diarrhea and nausea  Endocrine: Negative for cold intolerance and heat intolerance  Musculoskeletal: Negative for arthralgias and myalgias  Skin: Negative  Neurological: Negative for dizziness, weakness and headaches  Hematological: Negative for adenopathy  Does not bruise/bleed easily  Psychiatric/Behavioral: Positive for sleep disturbance       Past Medical History:   Diagnosis Date   • Arthritis    • Disease of thyroid gland    • Heart failure ([facility] [address] )    • Pacemaker    • Total knee replacement status, left        Past Surgical History:   Procedure Laterality Date   • ATRIAL CARDIAC PACEMAKER INSERTION     • CHOLECYSTECTOMY     • FOOT SURGERY     • HYSTERECTOMY         Current Outpatient Medications:   •  Acetaminophen (TYLENOL ARTHRITIS PAIN PO), Tylenol Arthritis Pain, Disp: , Rfl:   •  divalproex sodium (DEPAKOTE) 500 mg EC tablet, , Disp: , Rfl:   •  ergocalciferol (ERGOCALCIFEROL) 01885 units capsule, TK 1 C PO 1 TIME A WEEK, Disp: , Rfl:   •  furosemide (LASIX) 40 mg tablet, TAKE 1 TABLET BY MOUTH EVERY DAY, Disp: , Rfl:   •  ibuprofen (MOTRIN) 200 mg tablet, ibuprofen, Disp: , Rfl:   •  losartan (COZAAR) 100 MG tablet, losartan 100 mg tablet, Disp: , Rfl:   •  methimazole (TAPAZOLE) 5 mg tablet, , Disp: , Rfl:   •  rosuvastatin (CRESTOR) 10 MG tablet, Take 5 mg by mouth, Disp: , Rfl:   •  sertraline (ZOLOFT) 100 mg tablet, , Disp: , Rfl:   •  traZODone (DESYREL) 50 mg tablet, TAKE 1 TABLET(50 MG) BY MOUTH EVERY NIGHT AS NEEDED FOR SLEEP, Disp: , Rfl:   •  clindamycin (CLEOCIN) 300 MG capsule, clindamycin HCl 300 mg capsule  TAKE 2 CAPSULES BY MOUTH 1 HOUR PRIOR TO DENTAL WORK (Patient not taking: Reported on 2/9/2023), Disp: , Rfl:   •  denosumab (PROLIA) 60 mg/mL, Inject 60 mg under the skin, Disp: , Rfl:   •  metoprolol succinate (TOPROL-XL) 50 mg 24 hr tablet, Take 50 mg by mouth daily, Disp: , Rfl:     Allergies   Allergen Reactions   • Vancomycin Hives, Itching and Rash   • Penicillin G    • Sacubitril-Valsartan      Possible angioedema       Objective:  /78 (BP Location: Left arm, Patient Position: Sitting, Cuff Size: Standard)   Pulse 70   Temp (!) 97 4 °F (36 3 °C) (Temporal)   Ht 5' 2" (1 575 m)   Wt 84 4 kg (186 lb)   SpO2 96%   BMI 34 02 kg/m²    Physical Exam  Constitutional:       Appearance: Normal appearance  She is well-developed  HENT:      Head: Normocephalic and atraumatic  Eyes:      Conjunctiva/sclera: Conjunctivae normal       Pupils: Pupils are equal, round, and reactive to light  Pulmonary:      Effort: Pulmonary effort is normal  No respiratory distress  Musculoskeletal:         General: Normal range of motion  Cervical back: Normal range of motion  Lymphadenopathy:      Cervical: No cervical adenopathy  Skin:     General: Skin is dry  Neurological:      Mental Status: She is alert and oriented to person, place, and time  Psychiatric:         Behavior: Behavior normal      Please note: This report has been generated by a voice recognition software system  Therefore there may be syntax, spelling, and/or grammatical errors  Please call if you have any questions

## 2023-02-13 ENCOUNTER — APPOINTMENT (OUTPATIENT)
Dept: PHYSICAL THERAPY | Facility: REHABILITATION | Age: 78
End: 2023-02-13

## 2023-02-15 ENCOUNTER — OFFICE VISIT (OUTPATIENT)
Dept: PHYSICAL THERAPY | Facility: REHABILITATION | Age: 78
End: 2023-02-15

## 2023-02-15 DIAGNOSIS — G89.29 CHRONIC BILATERAL LOW BACK PAIN, UNSPECIFIED WHETHER SCIATICA PRESENT: Primary | ICD-10-CM

## 2023-02-15 DIAGNOSIS — G89.29 CHRONIC JAW PAIN: ICD-10-CM

## 2023-02-15 DIAGNOSIS — R68.84 CHRONIC JAW PAIN: ICD-10-CM

## 2023-02-15 DIAGNOSIS — M54.2 CHRONIC NECK PAIN: ICD-10-CM

## 2023-02-15 DIAGNOSIS — M54.50 CHRONIC BILATERAL LOW BACK PAIN, UNSPECIFIED WHETHER SCIATICA PRESENT: Primary | ICD-10-CM

## 2023-02-15 DIAGNOSIS — G89.29 CHRONIC NECK PAIN: ICD-10-CM

## 2023-02-15 NOTE — PROGRESS NOTES
Daily Note     Today's date: 2/15/2023  Patient name: Susy Weaver  : 1945  MRN: [id number]  Referring provider: Asuncion Stewart MD  Dx:   Encounter Diagnosis     ICD-10-CM    1  Chronic bilateral low back pain, unspecified whether sciatica present  M54 50     G89 29       2  Chronic neck pain  M54 2     G89 29       3  Chronic jaw pain  R68 84     G89 29                      Subjective: Did see the ortho doc today  Hips and left knee are good, but right knee is bone on bone  Did have injection today  Going to try and hold out on surgery  Objective: See treatment diary below      Assessment: Tolerated treatment well  Did discuss with patient activity modulation for knee following injection  Patient would benefit from continued PT      Plan: Continue per plan of care        Precautions: History of TMJ pathology, History of headaches, current pacemaker, previous history of heart failure, left knee previous TKA, history of varicose veins, current thyroid nodules       Manuals 1/18 1/25 1/26 2/1 2/2 2/8 2/15      SOR             Lateral Glides Cervical Spine             Thoracic Spine Mobilizations             Knee Distraction      SE 8' b       Neuro Re-Ed             Controlled Opening TMJ             TMJ Isometrics             Chin Tucks             Single arm Row  btb 2x12 B btb 2x12 B btb 2x12 B btb 2x12 B btb 2x12 B btb 2x15 b      Standing Chop  4 5# 2x12 B 4 5# 2x12 B 4 5# 2x12 B 5# 2x12 B 5# 2x12 B 5# 2x12 B      Resisted b/l shoulder flex, thumb up with serratus              Rows             Shoulder Extensions             Bilateral Shoulder ER             Ther Ex             Pt ed 25'            NuStep  8' L5 10' L5 10' L5 10' L5 10' L5 10' L5      Cervical Snag - ext/rot             Standing hip Abduction  rtb 3x8  rtb 3x8 rtb 3x8 rtb 3x8 rtb 3x8 rtb 3x8      Standing pball press - arm raise  2x15  2x15 2x15  2x15 2x15 2x15      Lumbar Pball roll   8x5" 8x5" 8x5" 8x5"  8x5" 8x5"      Chin Tuck - Banded Resistance              Neck Circles             Shrugs with c/s rot             Pec stretch over foam roll             Ther Activity                                       Gait Training                                       Modalities

## 2023-02-16 ENCOUNTER — OFFICE VISIT (OUTPATIENT)
Dept: PHYSICAL THERAPY | Facility: REHABILITATION | Age: 78
End: 2023-02-16

## 2023-02-16 DIAGNOSIS — G89.29 CHRONIC NECK PAIN: ICD-10-CM

## 2023-02-16 DIAGNOSIS — R68.84 CHRONIC JAW PAIN: ICD-10-CM

## 2023-02-16 DIAGNOSIS — G89.29 CHRONIC BILATERAL LOW BACK PAIN, UNSPECIFIED WHETHER SCIATICA PRESENT: Primary | ICD-10-CM

## 2023-02-16 DIAGNOSIS — G89.29 CHRONIC JAW PAIN: ICD-10-CM

## 2023-02-16 DIAGNOSIS — M54.50 CHRONIC BILATERAL LOW BACK PAIN, UNSPECIFIED WHETHER SCIATICA PRESENT: Primary | ICD-10-CM

## 2023-02-16 DIAGNOSIS — M54.2 CHRONIC NECK PAIN: ICD-10-CM

## 2023-02-16 NOTE — PROGRESS NOTES
PT Discharge     Today's date: 2023  Patient name: Allan Lilly  : 1945  MRN: [id number]  Referring provider: Shanta Loomis MD  Dx:   Encounter Diagnosis     ICD-10-CM    1  Chronic bilateral low back pain, unspecified whether sciatica present  M54 50     G89 29       2  Chronic neck pain  M54 2     G89 29       3  Chronic jaw pain  R68 84     G89 29                      Subjective: Going to wait on therapy until I see the spine surgeon  Pain: 4/10      Objective: See treatment diary below    Gait: Antalgic; left lateral trunk lean     Posture: Decreased lumbar lordosis, increased thoracic kyphosis     Lumbar AROM:   Flexion: 25% limitation  Extension: 50% limitation  Sideglide: 25% limitation B     Lower Quarter Screen: Unremarkable     Strength:   Knee Extension: R 4+/5  L 4-/5  Knee Flexion: R 5/5  L 4/5   Hip Abduction: R 3+/5  L 3/5   Hip Extension Knee Extended: R 3/5   L 3/5     Palpation: (+) Left anterior knee superior to patella and medial joint line     Passive SLR: (-) B   Active SLR: Unable B due to weakness      Cervical/Thoracic Comments  Cervical Spine AROM:   Flexion: WNL  Extension: 25% limited  Rotation: R 25% limited  L 25% limited  Sidebending: R 25% limited  L 25% limited    Cervical Spine PROM:   Flexion: WNL   Rotation: R WNL L 25% limited  Sidebending: R 25% limited L 25% limited    Posture: Forward Head  Rounded Shoulders     DNF Endurance: 8 seconds    Upper Cervical Rotation Test:   (+) Left    Thoracic Spine AROM:   Rotation: R 25% limited  L 25% limited  Extension: 50% limited    Joint Play Cervical Spine: Hypomobile     Assessment: Patient requested to hold therapy until she sees spine surgeon in March  Will be discharged from this episode at this time  Was able to make some progress, yet continues to have low back symptoms and left lower extremity referred symptoms impeding function   Educated to follow up with PT as needed after consult with spine surgeon  Goals  Impairment Based Goals:   Patient will have a decrease in pain by at least 50% in 4 weeks  (not met)  Patient will improve FOTO greater than predicted increase by discharge  Patient will improve cervical spine AROM by at least 25% in 4 weeks  (not met)  Patient will improve DNF endurance by at least 5 seconds in 5 weeks  (not met)    Functional Based Goals: To be met upon discharge  Patient will be independent with home exercise program  (met)  Patient will be able to manage symptoms independently  (not met)  Patient 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  (met)  Patient will be independent with household ADLs  (met)            Plan: Discharge        Precautions: History of TMJ pathology, History of headaches, current pacemaker, previous history of heart failure, left knee previous TKA, history of varicose veins, current thyroid nodules       Manuals 1/18 1/25 1/26 2/1 2/2 2/8 2/15 2/16     SOR             Lateral Glides Cervical Spine             Thoracic Spine Mobilizations             Knee Distraction      SE 8' b       Neuro Re-Ed             Single arm Row  btb 2x12 B btb 2x12 B btb 2x12 B btb 2x12 B btb 2x12 B btb 2x15 b btb 2x15 B     Standing Chop  4 5# 2x12 B 4 5# 2x12 B 4 5# 2x12 B 5# 2x12 B 5# 2x12 B 5# 2x12 B 5# 3x12 B     Rows        2x10 12#     Shoulder Extensions        3x8 8#     Ther Ex             Pt ed 25'            NuStep  8' L5 10' L5 10' L5 10' L5 10' L5 10' L5 10' L5     Cervical Snag - ext/rot             Standing hip Abduction  rtb 3x8  rtb 3x8 rtb 3x8 rtb 3x8 rtb 3x8 rtb 3x8 gtb 3x8     Standing pball press - arm raise  2x15  2x15 2x15  2x15 2x15 2x15 3x12     Lumbar Pball roll   8x5" 8x5" 8x5" 8x5"  8x5" 8x5" 8x5"     Chin Tuck - Banded Resistance         2x8 otb     Neck Circles             Shrugs with c/s rot        8# 3x10     Pec stretch over foam roll             Ther Activity Gait Training                                       Modalities

## 2023-07-10 ENCOUNTER — ESTABLISHED COMPREHENSIVE EXAM (OUTPATIENT)
Dept: URBAN - METROPOLITAN AREA CLINIC 6 | Facility: CLINIC | Age: 78
End: 2023-07-10

## 2023-07-10 DIAGNOSIS — H02.885: ICD-10-CM

## 2023-07-10 DIAGNOSIS — Z96.1: ICD-10-CM

## 2023-07-10 DIAGNOSIS — H35.371: ICD-10-CM

## 2023-07-10 DIAGNOSIS — Z98.890: ICD-10-CM

## 2023-07-10 DIAGNOSIS — H02.882: ICD-10-CM

## 2023-07-10 PROCEDURE — 92014 COMPRE OPH EXAM EST PT 1/>: CPT

## 2023-07-10 ASSESSMENT — VISUAL ACUITY
OD_CC: 20/30
OS_CC: 20/30
OD_CC: J1+
OS_CC: J1+

## 2023-07-10 ASSESSMENT — TONOMETRY
OS_IOP_MMHG: 10
OD_IOP_MMHG: 10

## 2023-08-07 ENCOUNTER — TELEPHONE (OUTPATIENT)
Dept: HEMATOLOGY ONCOLOGY | Facility: CLINIC | Age: 78
End: 2023-08-07

## 2023-08-08 ENCOUNTER — APPOINTMENT (OUTPATIENT)
Dept: LAB | Age: 78
End: 2023-08-08
Payer: COMMERCIAL

## 2023-08-08 ENCOUNTER — TELEPHONE (OUTPATIENT)
Dept: HEMATOLOGY ONCOLOGY | Facility: CLINIC | Age: 78
End: 2023-08-08

## 2023-08-08 DIAGNOSIS — D47.2 MONOCLONAL GAMMOPATHY OF UNKNOWN SIGNIFICANCE (MGUS): ICD-10-CM

## 2023-08-08 DIAGNOSIS — M79.7 FIBROMYALGIA: ICD-10-CM

## 2023-08-08 DIAGNOSIS — E88.09 PLASMA CELL DYSCRASIA: ICD-10-CM

## 2023-08-08 DIAGNOSIS — E53.8 VITAMIN B 12 DEFICIENCY: ICD-10-CM

## 2023-08-08 DIAGNOSIS — D50.9 IRON DEFICIENCY ANEMIA, UNSPECIFIED IRON DEFICIENCY ANEMIA TYPE: ICD-10-CM

## 2023-08-08 PROCEDURE — 83550 IRON BINDING TEST: CPT

## 2023-08-08 PROCEDURE — 82728 ASSAY OF FERRITIN: CPT

## 2023-08-08 PROCEDURE — 84165 PROTEIN E-PHORESIS SERUM: CPT

## 2023-08-08 PROCEDURE — 82784 ASSAY IGA/IGD/IGG/IGM EACH: CPT

## 2023-08-08 PROCEDURE — 83521 IG LIGHT CHAINS FREE EACH: CPT

## 2023-08-08 PROCEDURE — 36415 COLL VENOUS BLD VENIPUNCTURE: CPT

## 2023-08-08 PROCEDURE — 82607 VITAMIN B-12: CPT

## 2023-08-08 PROCEDURE — 86334 IMMUNOFIX E-PHORESIS SERUM: CPT

## 2023-08-08 PROCEDURE — 85025 COMPLETE CBC W/AUTO DIFF WBC: CPT

## 2023-08-08 PROCEDURE — 80053 COMPREHEN METABOLIC PANEL: CPT

## 2023-08-08 PROCEDURE — 83540 ASSAY OF IRON: CPT

## 2023-08-08 NOTE — TELEPHONE ENCOUNTER
Lab Inquiry   Who are you speaking with? Patient     If it is not the patient, are they listed on an active communication consent form? N/A   Name of ordering provider Tony Steinberg   What is being requested? Requesting clarification - Are labs needed for upcoming appointment on 8/9   Lab draw location    What is the best call back number?

## 2023-08-09 ENCOUNTER — OFFICE VISIT (OUTPATIENT)
Dept: HEMATOLOGY ONCOLOGY | Facility: CLINIC | Age: 78
End: 2023-08-09
Payer: COMMERCIAL

## 2023-08-09 VITALS
RESPIRATION RATE: 17 BRPM | WEIGHT: 182.5 LBS | SYSTOLIC BLOOD PRESSURE: 138 MMHG | BODY MASS INDEX: 33.58 KG/M2 | TEMPERATURE: 97.2 F | HEART RATE: 65 BPM | HEIGHT: 62 IN | DIASTOLIC BLOOD PRESSURE: 80 MMHG | OXYGEN SATURATION: 95 %

## 2023-08-09 DIAGNOSIS — D47.2 MONOCLONAL GAMMOPATHY OF UNKNOWN SIGNIFICANCE (MGUS): ICD-10-CM

## 2023-08-09 DIAGNOSIS — D50.9 IRON DEFICIENCY ANEMIA, UNSPECIFIED IRON DEFICIENCY ANEMIA TYPE: Primary | ICD-10-CM

## 2023-08-09 LAB
ALBUMIN SERPL BCP-MCNC: 3.4 G/DL (ref 3.5–5)
ALP SERPL-CCNC: 37 U/L (ref 46–116)
ALT SERPL W P-5'-P-CCNC: 17 U/L (ref 12–78)
ANION GAP SERPL CALCULATED.3IONS-SCNC: 4 MMOL/L
AST SERPL W P-5'-P-CCNC: 14 U/L (ref 5–45)
BASOPHILS # BLD AUTO: 0.05 THOUSANDS/ÂΜL (ref 0–0.1)
BASOPHILS NFR BLD AUTO: 1 % (ref 0–1)
BILIRUB SERPL-MCNC: 0.28 MG/DL (ref 0.2–1)
BUN SERPL-MCNC: 20 MG/DL (ref 5–25)
CALCIUM ALBUM COR SERPL-MCNC: 9.6 MG/DL (ref 8.3–10.1)
CALCIUM SERPL-MCNC: 9.1 MG/DL (ref 8.3–10.1)
CHLORIDE SERPL-SCNC: 105 MMOL/L (ref 96–108)
CO2 SERPL-SCNC: 29 MMOL/L (ref 21–32)
CREAT SERPL-MCNC: 1.09 MG/DL (ref 0.6–1.3)
EOSINOPHIL # BLD AUTO: 0.35 THOUSAND/ÂΜL (ref 0–0.61)
EOSINOPHIL NFR BLD AUTO: 5 % (ref 0–6)
ERYTHROCYTE [DISTWIDTH] IN BLOOD BY AUTOMATED COUNT: 13.3 % (ref 11.6–15.1)
FERRITIN SERPL-MCNC: 79 NG/ML (ref 11–307)
GFR SERPL CREATININE-BSD FRML MDRD: 49 ML/MIN/1.73SQ M
GLUCOSE SERPL-MCNC: 96 MG/DL (ref 65–140)
HCT VFR BLD AUTO: 37 % (ref 34.8–46.1)
HGB BLD-MCNC: 11.1 G/DL (ref 11.5–15.4)
IGA SERPL-MCNC: 146 MG/DL (ref 70–400)
IGG SERPL-MCNC: 872 MG/DL (ref 700–1600)
IGM SERPL-MCNC: 117 MG/DL (ref 40–230)
IMM GRANULOCYTES # BLD AUTO: 0.02 THOUSAND/UL (ref 0–0.2)
IMM GRANULOCYTES NFR BLD AUTO: 0 % (ref 0–2)
IRON SATN MFR SERPL: 20 % (ref 15–50)
IRON SERPL-MCNC: 59 UG/DL (ref 50–170)
LYMPHOCYTES # BLD AUTO: 2.64 THOUSANDS/ÂΜL (ref 0.6–4.47)
LYMPHOCYTES NFR BLD AUTO: 39 % (ref 14–44)
MCH RBC QN AUTO: 30.3 PG (ref 26.8–34.3)
MCHC RBC AUTO-ENTMCNC: 30 G/DL (ref 31.4–37.4)
MCV RBC AUTO: 101 FL (ref 82–98)
MONOCYTES # BLD AUTO: 0.48 THOUSAND/ÂΜL (ref 0.17–1.22)
MONOCYTES NFR BLD AUTO: 7 % (ref 4–12)
NEUTROPHILS # BLD AUTO: 3.18 THOUSANDS/ÂΜL (ref 1.85–7.62)
NEUTS SEG NFR BLD AUTO: 48 % (ref 43–75)
NRBC BLD AUTO-RTO: 0 /100 WBCS
PLATELET # BLD AUTO: 268 THOUSANDS/UL (ref 149–390)
PMV BLD AUTO: 9.3 FL (ref 8.9–12.7)
POTASSIUM SERPL-SCNC: 4.2 MMOL/L (ref 3.5–5.3)
PROT SERPL-MCNC: 6.6 G/DL (ref 6.4–8.4)
RBC # BLD AUTO: 3.66 MILLION/UL (ref 3.81–5.12)
SODIUM SERPL-SCNC: 138 MMOL/L (ref 135–147)
TIBC SERPL-MCNC: 292 UG/DL (ref 250–450)
VIT B12 SERPL-MCNC: 404 PG/ML (ref 180–914)
WBC # BLD AUTO: 6.72 THOUSAND/UL (ref 4.31–10.16)

## 2023-08-09 PROCEDURE — 99213 OFFICE O/P EST LOW 20 MIN: CPT | Performed by: NURSE PRACTITIONER

## 2023-08-09 NOTE — PROGRESS NOTES
[facility] HEMATOLOGY ONCOLOGY SPECIALISTS [place]  [address] [id number] [phone]  Hematology Ambulatory Follow-Up  [place], 1945, [id number]  8/9/2023    Assessment/Plan:    1. Iron deficiency anemia, unspecified iron deficiency anemia type  2. Monoclonal gammopathy of unknown significance   Patient is a 72-year-old female with a history of MGUS, iron deficiency status post pacemaker and knee replacement. Most recent CBC and CMP from 7/17/2023 were within normal limits, vitamin B12 level 690 iron saturation 20% with a ferritin level of 79. Patient had an SPEP that was unremarkable no suggestion of monoclonal protein previous protein faint IgM kappa, normal quantitative immunoglobulins, and free light chains showed an elevated kappa 24.34 ratio 1.82. Patient had repeat myeloma parameters on 8/8/2023 that are pending. If there is any significant difference I will contact patient with recommendations. Otherwise I will see her in 6 months, patient verbalized understanding and is in agreement with the plan. - CBC; Future  - Iron Panel (Includes Ferritin, Iron Sat%, Iron, and TIBC); Future    Barrier(s) to care: None  The patient is able to self care. [address] [facility]    Interval history: clinically stable    Review of Systems   Constitutional: Negative for activity change, appetite change, fatigue, fever and unexpected weight change. Respiratory: Negative for cough and shortness of breath. Cardiovascular: Negative for chest pain and leg swelling. Gastrointestinal: Negative for abdominal pain, constipation, diarrhea and nausea. Endocrine: Negative for cold intolerance and heat intolerance. Musculoskeletal: Negative for arthralgias and myalgias. Skin: Negative. Neurological: Negative for dizziness, weakness and headaches. Hematological: Negative for adenopathy.  Does not bruise/bleed easily. Past Medical History:   Diagnosis Date   • Arthritis    • Disease of thyroid gland    • Heart failure ([address])    • Pacemaker    • Total knee replacement status, left      Past Surgical History:   Procedure Laterality Date   • ATRIAL CARDIAC PACEMAKER INSERTION     • CHOLECYSTECTOMY     • FOOT SURGERY     • HYSTERECTOMY         Current Outpatient Medications:   •  Acetaminophen (TYLENOL ARTHRITIS PAIN PO), Tylenol Arthritis Pain, Disp: , Rfl:   •  denosumab (PROLIA) 60 mg/mL, Inject 60 mg under the skin, Disp: , Rfl:   •  divalproex sodium (DEPAKOTE) 500 mg EC tablet, , Disp: , Rfl:   •  ergocalciferol (ERGOCALCIFEROL) 90794 units capsule, TK 1 C PO 1 TIME A WEEK, Disp: , Rfl:   •  furosemide (LASIX) 40 mg tablet, TAKE 1 TABLET BY MOUTH EVERY DAY, Disp: , Rfl:   •  ibuprofen (MOTRIN) 200 mg tablet, ibuprofen, Disp: , Rfl:   •  losartan (COZAAR) 100 MG tablet, losartan 100 mg tablet, Disp: , Rfl:   •  methimazole (TAPAZOLE) 5 mg tablet, , Disp: , Rfl:   •  metoprolol succinate (TOPROL-XL) 50 mg 24 hr tablet, Take 50 mg by mouth daily, Disp: , Rfl:   •  rosuvastatin (CRESTOR) 10 MG tablet, Take 5 mg by mouth, Disp: , Rfl:   •  sertraline (ZOLOFT) 100 mg tablet, , Disp: , Rfl:   •  traZODone (DESYREL) 50 mg tablet, TAKE 1 TABLET(50 MG) BY MOUTH EVERY NIGHT AS NEEDED FOR SLEEP, Disp: , Rfl:   •  clindamycin (CLEOCIN) 300 MG capsule, clindamycin HCl 300 mg capsule  TAKE 2 CAPSULES BY MOUTH 1 HOUR PRIOR TO DENTAL WORK (Patient not taking: Reported on 2/9/2023), Disp: , Rfl:     Allergies   Allergen Reactions   • Vancomycin Hives, Itching and Rash   • Penicillin G    • Sacubitril-Valsartan      Possible angioedema     Objective:  /80 (BP Location: Left arm, Patient Position: Sitting, Cuff Size: Adult)   Pulse 65   Temp (!) 97.2 °F (36.2 °C)   Resp 17   Ht 5' 2" (1.575 m)   Wt 82.8 kg (182 lb 8 oz)   SpO2 95%   BMI 33.38 kg/m²    Physical Exam  Vitals reviewed.    Constitutional: Appearance: Normal appearance. She is well-developed. HENT:      Head: Normocephalic and atraumatic. Eyes:      Conjunctiva/sclera: Conjunctivae normal.      Pupils: Pupils are equal, round, and reactive to light. Pulmonary:      Effort: Pulmonary effort is normal. No respiratory distress. Musculoskeletal:         General: Normal range of motion. Cervical back: Normal range of motion. Lymphadenopathy:      Cervical: No cervical adenopathy. Skin:     General: Skin is dry. Neurological:      Mental Status: She is alert and oriented to person, place, and time.    Psychiatric:         Behavior: Behavior normal.     Result Review  Labs:  Appointment on 08/08/2023   Component Date Value Ref Range Status   • WBC 08/08/2023 6.72  4.31 - 10.16 Thousand/uL Final   • RBC 08/08/2023 3.66 (L)  3.81 - 5.12 Million/uL Final   • Hemoglobin 08/08/2023 11.1 (L)  11.5 - 15.4 g/dL Final   • Hematocrit 08/08/2023 37.0  34.8 - 46.1 % Final   • MCV 08/08/2023 101 (H)  82 - 98 fL Final   • MCH 08/08/2023 30.3  26.8 - 34.3 pg Final   • MCHC 08/08/2023 30.0 (L)  31.4 - 37.4 g/dL Final   • RDW 08/08/2023 13.3  11.6 - 15.1 % Final   • MPV 08/08/2023 9.3  8.9 - 12.7 fL Final   • Platelets 04/51/0004 268  149 - 390 Thousands/uL Final   • nRBC 08/08/2023 0  /100 WBCs Final   • Neutrophils Relative 08/08/2023 48  43 - 75 % Final   • Immat GRANS % 08/08/2023 0  0 - 2 % Final   • Lymphocytes Relative 08/08/2023 39  14 - 44 % Final   • Monocytes Relative 08/08/2023 7  4 - 12 % Final   • Eosinophils Relative 08/08/2023 5  0 - 6 % Final   • Basophils Relative 08/08/2023 1  0 - 1 % Final   • Neutrophils Absolute 08/08/2023 3.18  1.85 - 7.62 Thousands/µL Final   • Immature Grans Absolute 08/08/2023 0.02  0.00 - 0.20 Thousand/uL Final   • Lymphocytes Absolute 08/08/2023 2.64  0.60 - 4.47 Thousands/µL Final   • Monocytes Absolute 08/08/2023 0.48  0.17 - 1.22 Thousand/µL Final   • Eosinophils Absolute 08/08/2023 0.35  0.00 - 0.61 Thousand/µL Final   • Basophils Absolute 08/08/2023 0.05  0.00 - 0.10 Thousands/µL Final   • Sodium 08/08/2023 138  135 - 147 mmol/L Final   • Potassium 08/08/2023 4.2  3.5 - 5.3 mmol/L Final   • Chloride 08/08/2023 105  96 - 108 mmol/L Final   • CO2 08/08/2023 29  21 - 32 mmol/L Final   • ANION GAP 08/08/2023 4  mmol/L Final   • BUN 08/08/2023 20  5 - 25 mg/dL Final   • Creatinine 08/08/2023 1.09  0.60 - 1.30 mg/dL Final    Standardized to IDMS reference method   • Glucose 08/08/2023 96  65 - 140 mg/dL Final    If the patient is fasting, the ADA then defines impaired fasting glucose as > 100 mg/dL and diabetes as > or equal to 123 mg/dL. Specimen collection should occur prior to Sulfasalazine administration due to the potential for falsely depressed results. Specimen collection should occur prior to Sulfapyridine administration due to the potential for falsely elevated results. • Calcium 08/08/2023 9.1  8.3 - 10.1 mg/dL Final   • Corrected Calcium 08/08/2023 9.6  8.3 - 10.1 mg/dL Final   • AST 08/08/2023 14  5 - 45 U/L Final    Specimen collection should occur prior to Sulfasalazine administration due to the potential for falsely depressed results. • ALT 08/08/2023 17  12 - 78 U/L Final    Specimen collection should occur prior to Sulfasalazine and/or Sulfapyridine administration due to the potential for falsely depressed results. • Alkaline Phosphatase 08/08/2023 37 (L)  46 - 116 U/L Final   • Total Protein 08/08/2023 6.6  6.4 - 8.4 g/dL Final   • Albumin 08/08/2023 3.4 (L)  3.5 - 5.0 g/dL Final   • Total Bilirubin 08/08/2023 0.28  0.20 - 1.00 mg/dL Final    Use of this assay is not recommended for patients undergoing treatment with eltrombopag due to the potential for falsely elevated results.    • eGFR 08/08/2023 49  ml/min/1.73sq m Final   • Vitamin B-12 08/08/2023 404  180 - 914 pg/mL Final   • IGA 08/08/2023 146.0  70.0 - 400.0 mg/dL Final   • IGG 08/08/2023 872.0  700.0 - 1,600.0 mg/dL Final   • IGM 08/08/2023 117.0  40.0 - 230.0 mg/dL Final   • A/G Ratio 08/08/2023 1.48  1.10 - 1.80 Final   • Albumin Electrophoresis 08/08/2023 59.6  48.0 - 70.0 % Final   • Albumin CONC 08/08/2023 3.81  3.20 - 5.10 g/dl Final   • Alpha 1 08/08/2023 4.4  1.8 - 7.0 % Final   • ALPHA 1 CONC 08/08/2023 0.28  0.15 - 0.47 g/dL Final   • Alpha 2 08/08/2023 10.6  5.9 - 14.9 % Final   • ALPHA 2 CONC 08/08/2023 0.68  0.42 - 1.04 g/dL Final   • Beta-1 08/08/2023 6.0  4.7 - 7.7 % Final   • BETA 1 CONC 08/08/2023 0.38  0.31 - 0.57 g/dL Final   • Beta-2 08/08/2023 5.2  3.1 - 7.9 % Final   • BETA 2 CONC 08/08/2023 0.33  0.20 - 0.58 g/dL Final   • Gamma Globulin 08/08/2023 14.2  6.9 - 22.3 % Final   • GAMMA CONC 08/08/2023 0.91  0.40 - 1.66 g/dL Final   • Total Protein 08/08/2023 6.4  6.4 - 8.2 g/dL Final   • SPEP Interpretation 08/08/2023 See Comment   Final    The SPEP shows an abnormal distribution in the gamma region. Given the history of an MGUS, immunofixation to be performed. Reviewed by: Shirley Teresa MD **Electronic Signature**   • Iron Saturation 08/08/2023 20  15 - 50 % Final   • TIBC 08/08/2023 292  250 - 450 ug/dL Final   • Iron 08/08/2023 59  50 - 170 ug/dL Final    Patients treated with metal-binding drugs (ie. Deferoxamine) may have depressed iron values. • Ferritin 08/08/2023 79  11 - 307 ng/mL Final   • Immunofixation Interpretation 08/08/2023 See Comment   Final    Serum immunofixation shows a faint monoclonal gammopathy in the gamma region identified as IgM-kappa, too small to measure by densitometry. Correlate with UPEP and urine free light chains (kappa, lambda) to evaluate for Bence-Jarquin proteinuria, if clinically indicated. Reviewed by: Shirley Teresa MD **Electronic Signature**     Please note: This report has been generated by a voice recognition software system. Therefore there may be syntax, spelling, and/or grammatical errors. Please call if you have any questions.

## 2023-08-11 LAB
ALBUMIN SERPL ELPH-MCNC: 3.81 G/DL (ref 3.2–5.1)
ALBUMIN SERPL ELPH-MCNC: 59.6 % (ref 48–70)
ALPHA1 GLOB SERPL ELPH-MCNC: 0.28 G/DL (ref 0.15–0.47)
ALPHA1 GLOB SERPL ELPH-MCNC: 4.4 % (ref 1.8–7)
ALPHA2 GLOB SERPL ELPH-MCNC: 0.68 G/DL (ref 0.42–1.04)
ALPHA2 GLOB SERPL ELPH-MCNC: 10.6 % (ref 5.9–14.9)
BETA GLOB ABNORMAL SERPL ELPH-MCNC: 0.38 G/DL (ref 0.31–0.57)
BETA1 GLOB SERPL ELPH-MCNC: 6 % (ref 4.7–7.7)
BETA2 GLOB SERPL ELPH-MCNC: 5.2 % (ref 3.1–7.9)
BETA2+GAMMA GLOB SERPL ELPH-MCNC: 0.33 G/DL (ref 0.2–0.58)
GAMMA GLOB ABNORMAL SERPL ELPH-MCNC: 0.91 G/DL (ref 0.4–1.66)
GAMMA GLOB SERPL ELPH-MCNC: 14.2 % (ref 6.9–22.3)
IGG/ALB SER: 1.48 {RATIO} (ref 1.1–1.8)
INTERPRETATION UR IFE-IMP: NORMAL
KAPPA LC FREE SER-MCNC: 25.1 MG/L (ref 3.3–19.4)
KAPPA LC FREE/LAMBDA FREE SER: 1.79 {RATIO} (ref 0.26–1.65)
LAMBDA LC FREE SERPL-MCNC: 14 MG/L (ref 5.7–26.3)
PROT PATTERN SERPL ELPH-IMP: NORMAL
PROT SERPL-MCNC: 6.4 G/DL (ref 6.4–8.2)

## 2023-08-11 PROCEDURE — 84165 PROTEIN E-PHORESIS SERUM: CPT | Performed by: STUDENT IN AN ORGANIZED HEALTH CARE EDUCATION/TRAINING PROGRAM

## 2023-08-11 PROCEDURE — 86334 IMMUNOFIX E-PHORESIS SERUM: CPT | Performed by: STUDENT IN AN ORGANIZED HEALTH CARE EDUCATION/TRAINING PROGRAM

## 2024-02-21 ENCOUNTER — TELEPHONE (OUTPATIENT)
Age: 79
End: 2024-02-21

## 2024-03-06 ENCOUNTER — OFFICE VISIT (OUTPATIENT)
Dept: GASTROENTEROLOGY | Facility: CLINIC | Age: 79
End: 2024-03-06
Payer: MEDICARE

## 2024-03-06 VITALS
BODY MASS INDEX: 32.57 KG/M2 | WEIGHT: 177 LBS | DIASTOLIC BLOOD PRESSURE: 58 MMHG | SYSTOLIC BLOOD PRESSURE: 98 MMHG | HEART RATE: 60 BPM | HEIGHT: 62 IN

## 2024-03-06 DIAGNOSIS — R14.3 EXCESSIVE GAS: ICD-10-CM

## 2024-03-06 DIAGNOSIS — R63.4 WEIGHT LOSS, UNINTENTIONAL: ICD-10-CM

## 2024-03-06 DIAGNOSIS — K21.00 GASTROESOPHAGEAL REFLUX DISEASE WITH ESOPHAGITIS WITHOUT HEMORRHAGE: ICD-10-CM

## 2024-03-06 DIAGNOSIS — R68.81 EARLY SATIETY: ICD-10-CM

## 2024-03-06 DIAGNOSIS — J90 RECURRENT PLEURAL EFFUSION: ICD-10-CM

## 2024-03-06 DIAGNOSIS — K31.84 GASTROPARESIS: ICD-10-CM

## 2024-03-06 DIAGNOSIS — R19.7 DIARRHEA, UNSPECIFIED TYPE: ICD-10-CM

## 2024-03-06 DIAGNOSIS — R63.0 DECREASED APPETITE: Primary | ICD-10-CM

## 2024-03-06 PROBLEM — K58.9 IRRITABLE COLON: Status: ACTIVE | Noted: 2024-03-06

## 2024-03-06 PROBLEM — G40.909 SEIZURE DISORDER (HCC): Status: ACTIVE | Noted: 2024-01-19

## 2024-03-06 PROBLEM — E05.90 HYPERTHYROIDISM: Status: ACTIVE | Noted: 2021-05-19

## 2024-03-06 PROBLEM — M54.16 LUMBAR RADICULOPATHY: Status: ACTIVE | Noted: 2021-12-13

## 2024-03-06 PROBLEM — I50.22 CHRONIC SYSTOLIC HEART FAILURE (HCC): Chronic | Status: ACTIVE | Noted: 2019-02-07

## 2024-03-06 PROBLEM — Z96.652 PRESENCE OF LEFT ARTIFICIAL KNEE JOINT: Status: ACTIVE | Noted: 2021-06-21

## 2024-03-06 PROBLEM — R07.9 CHEST PAIN: Status: ACTIVE | Noted: 2023-09-15

## 2024-03-06 PROBLEM — K59.00 CONSTIPATION: Status: ACTIVE | Noted: 2024-03-06

## 2024-03-06 PROBLEM — Z95.810 BIVENTRICULAR IMPLANTABLE CARDIOVERTER-DEFIBRILLATOR IN SITU: Chronic | Status: ACTIVE | Noted: 2019-10-25

## 2024-03-06 PROBLEM — I42.8 NICM (NONISCHEMIC CARDIOMYOPATHY) (HCC): Chronic | Status: ACTIVE | Noted: 2019-10-25

## 2024-03-06 PROBLEM — R42 DIZZINESS: Status: ACTIVE | Noted: 2021-03-02

## 2024-03-06 PROBLEM — Z95.0 STATUS POST BIVENTRICULAR PACEMAKER: Status: ACTIVE | Noted: 2024-01-09

## 2024-03-06 PROBLEM — I47.20 VT (VENTRICULAR TACHYCARDIA) (HCC): Status: ACTIVE | Noted: 2023-09-29

## 2024-03-06 PROBLEM — K21.9 ESOPHAGEAL REFLUX: Status: ACTIVE | Noted: 2024-03-06

## 2024-03-06 PROCEDURE — 99204 OFFICE O/P NEW MOD 45 MIN: CPT | Performed by: PHYSICIAN ASSISTANT

## 2024-03-06 RX ORDER — OMEPRAZOLE 40 MG/1
40 CAPSULE, DELAYED RELEASE ORAL
Qty: 30 CAPSULE | Refills: 3 | Status: SHIPPED | OUTPATIENT
Start: 2024-03-06

## 2024-03-06 RX ORDER — EPLERENONE 50 MG/1
TABLET, FILM COATED ORAL
COMMUNITY
Start: 2024-01-18

## 2024-03-06 RX ORDER — ALBUTEROL SULFATE 90 UG/1
2 AEROSOL, METERED RESPIRATORY (INHALATION) EVERY 6 HOURS PRN
COMMUNITY
Start: 2023-09-17

## 2024-03-06 RX ORDER — ACETAMINOPHEN AND CODEINE PHOSPHATE 300; 30 MG/1; MG/1
1 TABLET ORAL EVERY 6 HOURS
COMMUNITY
Start: 2023-12-05

## 2024-03-06 RX ORDER — HYDROCODONE BITARTRATE AND ACETAMINOPHEN 7.5; 325 MG/1; MG/1
TABLET ORAL
COMMUNITY
Start: 2024-02-04

## 2024-03-06 NOTE — PATIENT INSTRUCTIONS
Leave stool samples   2. Then start omeprazole   3. Schedule /complete Upper GI series   Work on eating smaller more frequent meals. Low in fat and low in fiber. Nothing greasy or fatty. Limit raw fruits and veggies

## 2024-03-06 NOTE — PROGRESS NOTES
[facility] - Outpatient Consultation New Patient  Sandra Lopez 78 y.o. female MRN: [id number]  Encounter: [id number]  ASSESSMENT AND PLAN:    1. Decreased appetite  2. Early satiety  3. Excessive gas  4. Weight loss, unintentional  5. Gastroesophageal reflux disease with esophagitis without hemorrhage  6. Gastroparesis  Patient with 4 to 6 months of ongoing upper GI symptoms.  We reviewed her previous workup including EGD, CT scan abdomen pelvis, nuclear medicine gastric emptying scan.  Explained to the patient that she does have evidence of mild gastroparesis as well as esophagitis on prior EGD she expressed understanding to this.    I explained to the patient that gastroparesis and uncontrolled GERD could certainly be the cause of her ongoing symptoms  I recommend she complete stool testing including stool H. Pylori ASAP   Discussed with the patient that as soon as she leaves the stool studies/drop them off at the lab I would like her to start omeprazole 40 mg once daily before breakfast and monitor her symptom response  We will also order a upper GI series for further evaluation of ongoing symptoms  In addition to starting PPI I also recommended patient begin to follow more of a GERD diet/lifestyle.  We discussed eating smaller, more frequent meals, limiting fat and fiber in foods.  She expressed understanding to this.    - H. pylori antigen, stool; Future  - omeprazole (PriLOSEC) 40 MG capsule; Take 1 capsule (40 mg total) by mouth daily before breakfast  Dispense: 30 capsule; Refill: 3  - FL upper GI UGI; Future    7. Diarrhea, unspecified type  Recommended she complete stool studies as soon as possible.  She will do this.    - H pylori; Future  - Giardia antigen; Future  - Clostridium difficile toxin by PCR with EIA; Future  - Ova and parasite examination; Future  - Stool Enteric Bacterial Panel by PCR; Future  - Cryptosporidium Smear; Future    8. Recurrent pleural  effusion  Recommended ongoing follow-up with pulmonology and cardiology.  She would be high risk for sedation/anesthesia at this time.  No plans for procedures at this time.    Patient was instructed to call the office with any questions, concerns, new/ worsening/ persisting GI symptoms. Advised patient go to the ER with any severe or worsening abdominal pain, fevers/ chills, intractable N/V, chest pain, SOB, dizziness, lightheadedness, feeling something stuck in esophagus that will not go down. Patient expressed understanding and is in agreement with treatment plan.       Will plan to follow up after diagnostic tests   ________________________________________________________    HPI:    Patient is new to me and our office.  Patient with a past medical history of MGUS, iron deficiency, reflux, IBS, hypothyroidism, chronic systolic CHF with nonischemic cardiomyopathy, history of ventricular tachycardia with pacemaker defibrillator in place, fibromyalgia, history of seizure disorder, history of cholecystectomy, hyperlipidemia, history of c diff s/p fecal transplant history of pleural effusion status post recent thoracentesis last month presents to the office today with multiple complaints.    Patient complains of decreased appetite and early satiety x 4- 6 months.   With this there was associated unintentional weight loss 10-15 lbs.   She also complains of excessive gas, nausea, generalized abdominal discomfort   Patient also reports travel on recent cruise- having loose stools/ diarrhea ever since. Normally patient tells me she has 1 BM/ week   Patient denies any fevers/ chills, vomiting, black or bloody stools, heartburn, acid reflux,  dysphagia, odynophagia.     Of note she is dealing with recurrent plural effusions of unknown etiology. Has needed a thoracentesis x 2     REVIEW OF SYSTEMS:    Review of Systems   Constitutional:  Positive for appetite change (decreased) and unexpected weight change (weight loss).  Negative for chills and fever.   HENT:  Negative for ear pain and sore throat.    Eyes:  Negative for pain and visual disturbance.   Respiratory:  Negative for cough and shortness of breath.    Cardiovascular:  Negative for chest pain and palpitations.   Gastrointestinal:  Positive for diarrhea. Negative for constipation and vomiting.   Genitourinary:  Positive for frequency. Negative for dysuria and hematuria.   Musculoskeletal:  Positive for arthralgias and myalgias. Negative for back pain.   Skin:  Negative for color change and rash.   Neurological:  Positive for headaches. Negative for seizures and syncope.   All other systems reviewed and are negative.       Historical Information   Past Medical History:   Diagnosis Date    Arthritis     Disease of thyroid gland     Heart failure (HCC)     Pacemaker     Total knee replacement status, left      Past Surgical History:   Procedure Laterality Date    ATRIAL CARDIAC PACEMAKER INSERTION      CHOLECYSTECTOMY      FOOT SURGERY      HYSTERECTOMY      US GUIDED THYROID BIOPSY  7/19/2021    US GUIDED THYROID BIOPSY  11/23/2022     Social History   Social History     Substance and Sexual Activity   Alcohol Use Yes    Comment: very little     Social History     Substance and Sexual Activity   Drug Use No     Social History     Tobacco Use   Smoking Status Never   Smokeless Tobacco Never     Family History   Problem Relation Age of Onset    Hypertension Mother     Cancer Mother     COPD Mother     COPD Father     Diabetes Family     Arthritis Family        Meds/Allergies       Current Outpatient Medications:     Acetaminophen (TYLENOL ARTHRITIS PAIN PO)    albuterol (PROVENTIL HFA,VENTOLIN HFA) 90 mcg/act inhaler    clindamycin (CLEOCIN) 300 MG capsule    Cymbalta 60 MG delayed release capsule    denosumab (PROLIA) 60 mg/mL    divalproex sodium (DEPAKOTE) 500 mg EC tablet    eplerenone (INSPRA) 50 MG tablet    ergocalciferol (ERGOCALCIFEROL) 68798 units capsule    losartan  (COZAAR) 100 MG tablet    methimazole (TAPAZOLE) 5 mg tablet    metoprolol succinate (TOPROL-XL) 50 mg 24 hr tablet    rosuvastatin (CRESTOR) 10 MG tablet    sertraline (ZOLOFT) 100 mg tablet    traZODone (DESYREL) 50 mg tablet    acetaminophen-codeine (TYLENOL with CODEINE #3) 300-30 MG per tablet    furosemide (LASIX) 40 mg tablet    HYDROcodone-acetaminophen (NORCO) 7.5-325 mg per tablet    ibuprofen (MOTRIN) 200 mg tablet    Allergies   Allergen Reactions    Sacubitril-Valsartan Anaphylaxis     Possible angioedema    Vancomycin Hives, Itching and Rash    Penicillin G            Objective   Wt Readings from Last 3 Encounters:   03/06/24 80.3 kg (177 lb)   08/09/23 82.8 kg (182 lb 8 oz)   02/09/23 84.4 kg (186 lb)     Temp Readings from Last 3 Encounters:   08/09/23 (!) 97.2 °F (36.2 °C)   02/09/23 (!) 97.4 °F (36.3 °C) (Temporal)   10/06/22 (!) 97.3 °F (36.3 °C)     BP Readings from Last 3 Encounters:   03/06/24 98/58   08/09/23 138/80   02/09/23 126/78     Pulse Readings from Last 3 Encounters:   03/06/24 60   08/09/23 65   02/09/23 70        PHYSICAL EXAM:     Physical Exam  Vitals reviewed.   Constitutional:       General: She is not in acute distress.     Appearance: She is obese. She is not toxic-appearing.   HENT:      Head: Normocephalic and atraumatic.   Eyes:      Extraocular Movements: Extraocular movements intact.      Conjunctiva/sclera: Conjunctivae normal.   Cardiovascular:      Rate and Rhythm: Normal rate and regular rhythm.   Pulmonary:      Effort: Pulmonary effort is normal. No respiratory distress.      Breath sounds: Normal breath sounds.   Abdominal:      General: Bowel sounds are normal.      Palpations: Abdomen is soft.      Tenderness: There is no abdominal tenderness.   Musculoskeletal:         General: No swelling or tenderness.      Cervical back: Normal range of motion and neck supple.   Skin:     General: Skin is warm and dry.      Coloration: Skin is not jaundiced.   Neurological:       General: No focal deficit present.      Mental Status: She is alert and oriented to person, place, and time. Mental status is at baseline.   Psychiatric:         Mood and Affect: Mood normal.         Behavior: Behavior normal.         Thought Content: Thought content normal.         Lab Results:   Lab Results   Component Value Date    WBC 6.72 08/08/2023    HGB 11.1 (L) 08/08/2023    HCT 37.0 08/08/2023     (H) 08/08/2023     08/08/2023       Lab Results   Component Value Date    SODIUM 137 01/03/2024    K 4.7 01/03/2024    CL 97 (L) 01/03/2024    CO2 32 (H) 01/03/2024    AGAP 8 01/03/2024    BUN 20 01/03/2024    CREATININE 0.98 01/03/2024    GLUC 83 01/03/2024    CALCIUM 9.1 01/03/2024    AST 11 11/01/2023    ALT 7 11/01/2023    ALKPHOS 32 (L) 11/01/2023    TP 6.8 01/09/2024    TBILI 0.3 11/01/2023    EGFR 60 01/03/2024     Celiac panel 9/30/2023 reviewed from [facility], this was normal/negative    Radiology Results:   CT of the abdomen and pelvis with IV contrast was completed 1/10/2024 at [facility] for abdominal pain, this was reviewed, this showed sigmoid diverticulosis, otherwise no acute abnormality in the abdomen or pelvis.  A persistent left effusion with left basilar consolidation was seen.  Normal liver.  Patient status post cholecystectomy.  Pancreas within normal limits.  Bowel otherwise normal.  There was a small fat-containing midline hernia without inflammatory straining seen in the abdominal wall.  No suspiciously enlarged lymph nodes.    Nuclear medicine gastric emptying scan completed at [facility] 1/3/2024 reviewed, this was done for early satiety, overall findings suggest mild delayed solid gastric emptying.  T half-time 154 minutes.    Prior Procedure Reports/Results  Patient underwent EGD and colonoscopy 5/12/2022 with [facility] EP GI.  Procedure reports reviewed in the chart.      EGD revealed LA grade a esophagitis.  Also antral gastritis.  There is mild gastric body erythema which was  biopsied.  Normal duodenum.  Duodenal biopsy was normal, negative for celiac disease.  Gastric biopsies unremarkable, negative for H. pylori     Colonoscopy reviewed as well, this showed sigmoid diverticulosis in the setting of a fixed and tortuous colon.  The prep was fair.  A scar from prior hemorrhoid treated was noted.  External hemorrhoids were seen.  Random colon biopsies were normal, negative for microscopic colitis.    Kylee Padilla PA-C   Available on [employer]

## 2024-03-07 ENCOUNTER — APPOINTMENT (OUTPATIENT)
Dept: LAB | Facility: CLINIC | Age: 79
End: 2024-03-07
Payer: COMMERCIAL

## 2024-03-07 DIAGNOSIS — D50.9 IRON DEFICIENCY ANEMIA, UNSPECIFIED IRON DEFICIENCY ANEMIA TYPE: ICD-10-CM

## 2024-03-07 DIAGNOSIS — R19.7 DIARRHEA, UNSPECIFIED TYPE: ICD-10-CM

## 2024-03-07 DIAGNOSIS — R63.0 DECREASED APPETITE: ICD-10-CM

## 2024-03-07 LAB
ERYTHROCYTE [DISTWIDTH] IN BLOOD BY AUTOMATED COUNT: 14.1 % (ref 11.6–15.1)
FERRITIN SERPL-MCNC: 95 NG/ML (ref 11–307)
HCT VFR BLD AUTO: 35.9 % (ref 34.8–46.1)
HGB BLD-MCNC: 11.3 G/DL (ref 11.5–15.4)
IRON SATN MFR SERPL: 19 % (ref 15–50)
IRON SERPL-MCNC: 52 UG/DL (ref 50–212)
MCH RBC QN AUTO: 29.1 PG (ref 26.8–34.3)
MCHC RBC AUTO-ENTMCNC: 31.5 G/DL (ref 31.4–37.4)
MCV RBC AUTO: 93 FL (ref 82–98)
PLATELET # BLD AUTO: 311 THOUSANDS/UL (ref 149–390)
PMV BLD AUTO: 8.8 FL (ref 8.9–12.7)
RBC # BLD AUTO: 3.88 MILLION/UL (ref 3.81–5.12)
TIBC SERPL-MCNC: 275 UG/DL (ref 250–450)
UIBC SERPL-MCNC: 223 UG/DL (ref 155–355)
WBC # BLD AUTO: 7.33 THOUSAND/UL (ref 4.31–10.16)

## 2024-03-07 PROCEDURE — 83550 IRON BINDING TEST: CPT

## 2024-03-07 PROCEDURE — 87177 OVA AND PARASITES SMEARS: CPT

## 2024-03-07 PROCEDURE — 87015 SPECIMEN INFECT AGNT CONCNTJ: CPT

## 2024-03-07 PROCEDURE — 85027 COMPLETE CBC AUTOMATED: CPT

## 2024-03-07 PROCEDURE — 87329 GIARDIA AG IA: CPT

## 2024-03-07 PROCEDURE — 87206 SMEAR FLUORESCENT/ACID STAI: CPT

## 2024-03-07 PROCEDURE — 87505 NFCT AGENT DETECTION GI: CPT

## 2024-03-07 PROCEDURE — 87209 SMEAR COMPLEX STAIN: CPT

## 2024-03-07 PROCEDURE — 83540 ASSAY OF IRON: CPT

## 2024-03-07 PROCEDURE — 36415 COLL VENOUS BLD VENIPUNCTURE: CPT

## 2024-03-07 PROCEDURE — 82728 ASSAY OF FERRITIN: CPT

## 2024-03-07 PROCEDURE — 87493 C DIFF AMPLIFIED PROBE: CPT

## 2024-03-08 ENCOUNTER — APPOINTMENT (OUTPATIENT)
Dept: LAB | Facility: CLINIC | Age: 79
End: 2024-03-08
Payer: COMMERCIAL

## 2024-03-08 LAB
C COLI+JEJUNI TUF STL QL NAA+PROBE: NEGATIVE
C DIFF TOX GENS STL QL NAA+PROBE: NEGATIVE
EC STX1+STX2 GENES STL QL NAA+PROBE: NEGATIVE
G LAMBLIA AG STL QL IA: NEGATIVE
SALMONELLA SP SPAO STL QL NAA+PROBE: NEGATIVE
SHIGELLA SP+EIEC IPAH STL QL NAA+PROBE: NEGATIVE

## 2024-03-08 PROCEDURE — 87338 HPYLORI STOOL AG IA: CPT

## 2024-03-09 LAB
CRYPTOSP STL QL ACID FAST STN: NORMAL
H PYLORI AG STL QL IA: NEGATIVE
I BELLI SPEC QL ACID FAST MOD KINY STN: NORMAL

## 2024-03-15 ENCOUNTER — TELEPHONE (OUTPATIENT)
Dept: GASTROENTEROLOGY | Facility: CLINIC | Age: 79
End: 2024-03-15

## 2024-03-15 NOTE — TELEPHONE ENCOUNTER
Pt called to find out where apt for FL UPPER GI would take place. Provided address to pt. No other questions.

## 2024-03-18 ENCOUNTER — HOSPITAL ENCOUNTER (OUTPATIENT)
Dept: RADIOLOGY | Facility: HOSPITAL | Age: 79
Discharge: HOME | End: 2024-03-18
Payer: COMMERCIAL

## 2024-03-18 DIAGNOSIS — R68.81 EARLY SATIETY: ICD-10-CM

## 2024-03-18 DIAGNOSIS — R63.0 DECREASED APPETITE: ICD-10-CM

## 2024-03-18 DIAGNOSIS — K21.00 GASTROESOPHAGEAL REFLUX DISEASE WITH ESOPHAGITIS WITHOUT HEMORRHAGE: ICD-10-CM

## 2024-03-18 PROCEDURE — 74240 X-RAY XM UPR GI TRC 1CNTRST: CPT

## 2024-03-20 ENCOUNTER — PREP FOR PROCEDURE (OUTPATIENT)
Dept: GASTROENTEROLOGY | Facility: CLINIC | Age: 79
End: 2024-03-20

## 2024-03-20 ENCOUNTER — TELEPHONE (OUTPATIENT)
Age: 79
End: 2024-03-20

## 2024-03-20 ENCOUNTER — TELEPHONE (OUTPATIENT)
Dept: GASTROENTEROLOGY | Facility: CLINIC | Age: 79
End: 2024-03-20

## 2024-03-20 DIAGNOSIS — R68.81 EARLY SATIETY: ICD-10-CM

## 2024-03-20 DIAGNOSIS — K31.7 MULTIPLE GASTRIC POLYPS: ICD-10-CM

## 2024-03-20 DIAGNOSIS — R93.3 ABNORMAL UPPER GASTROINTESTINAL BARIUM SERIES: Primary | ICD-10-CM

## 2024-03-20 DIAGNOSIS — R63.4 WEIGHT LOSS, UNINTENTIONAL: ICD-10-CM

## 2024-03-20 DIAGNOSIS — R63.0 DECREASED APPETITE: ICD-10-CM

## 2024-03-20 NOTE — TELEPHONE ENCOUNTER
Patients GI provider:  INGA Padilla    Number to return call: [phone]    Reason for call: Patient contacting office for the results of FL upper GI UGI that was completed on 3/18/24. Results are in chart.    Scheduled procedure/appointment date if applicable: Apt 6/6/24

## 2024-03-20 NOTE — TELEPHONE ENCOUNTER
I called and spoke with patient regarding UGI series results   Explained it is showing multiple small gastric polyps   Otherwise unremarkable   She did have an EGD in 2022 which did NOT show gastric polyps   She expressed understand to results   All questions answered   She continues with symptoms as detailed in my last office note     At this time given ongoing symptoms and abnormal barium UGI series I think a repeat EGD is warranted especially given her weight loss. She is agreeable to this. Message sent to clerical team to have this scheduled in HOSPITAL setting with clearance from both CARDIOLOGY AND PULMONOLOGY (both with [facility]/ [facility])PRIOR for patient to undergo procedures.       Patient was instructed to call the office with any questions, concerns, new/ worsening/ persisting GI symptoms.     Patient expressed understanding and is in agreement with treatment plan. Will plan to follow up after diagnostic tests in a few months

## 2024-03-21 ENCOUNTER — TELEPHONE (OUTPATIENT)
Dept: GASTROENTEROLOGY | Facility: CLINIC | Age: 79
End: 2024-03-21

## 2024-03-21 NOTE — TELEPHONE ENCOUNTER
Cardiac Clearance faxed to Dr. Larsen's office 03/21/2024    Pulmonary Clearance faxed to Dr. Lara's office 03/21/2024

## 2024-03-21 NOTE — TELEPHONE ENCOUNTER
----- Message from Desean Agosto sent at 3/21/2024  2:10 PM EDT -----  Regarding: FW: EGD hospital setting- need clearances  Angel Jones,     It looks like you took care of scheduling of the procedure, just sending to your to f/u on any clearances if not already done, thanks!   ----- Message -----  From: Kylee Padilla PA-C  Sent: 3/20/2024   4:52 PM EDT  To: #  Subject: EGD hospital setting- need clearances            Hello   Please schedule patient for EGD in the HOSPITAL setting   Need clearance from cardiology AND pulmonology prior for patient to undergo procedures - both cardiology and pulmonology offices are with [facility]- info is in chart       Order is placed     Any questions or concerns please let me know.   Thanks!  Kylee Padilla PA-C

## 2024-03-21 NOTE — TELEPHONE ENCOUNTER
Scheduled date of EGD(as of today): 04/02/2024  Physician performing EGD: Dr. Rojas  Location of EGD: [facility]  Instructions reviewed with patient by: Karen mailed to address of record  Clearances: Cardiac, Dr. Larsen and Pulmonary Dr. Lara faxed 03/21/2024

## 2024-03-21 NOTE — TELEPHONE ENCOUNTER
----- Message from Kylee Padilla PA-C sent at 3/20/2024  4:50 PM EDT -----  Regarding: EGD hospital setting- need clearances  Hello   Please schedule patient for EGD in the HOSPITAL setting   Need clearance from cardiology AND pulmonology prior for patient to undergo procedures - both cardiology and pulmonology offices are with [facility]- info is in chart       Order is placed     Any questions or concerns please let me know.   Thanks!  Kylee Padilla PA-C

## 2024-04-01 ENCOUNTER — TELEPHONE (OUTPATIENT)
Dept: GASTROENTEROLOGY | Facility: HOSPITAL | Age: 79
End: 2024-04-01

## 2024-04-02 ENCOUNTER — ANESTHESIA EVENT (OUTPATIENT)
Dept: GASTROENTEROLOGY | Facility: HOSPITAL | Age: 79
End: 2024-04-02

## 2024-04-02 ENCOUNTER — ANESTHESIA (OUTPATIENT)
Dept: GASTROENTEROLOGY | Facility: HOSPITAL | Age: 79
End: 2024-04-02

## 2024-04-02 ENCOUNTER — HOSPITAL ENCOUNTER (OUTPATIENT)
Dept: GASTROENTEROLOGY | Facility: HOSPITAL | Age: 79
Setting detail: OUTPATIENT SURGERY
Discharge: HOME | End: 2024-04-02
Attending: INTERNAL MEDICINE | Admitting: INTERNAL MEDICINE
Payer: COMMERCIAL

## 2024-04-02 VITALS
OXYGEN SATURATION: 97 % | WEIGHT: 171 LBS | BODY MASS INDEX: 31.28 KG/M2 | SYSTOLIC BLOOD PRESSURE: 113 MMHG | RESPIRATION RATE: 12 BRPM | HEART RATE: 61 BPM | DIASTOLIC BLOOD PRESSURE: 53 MMHG | TEMPERATURE: 96.3 F

## 2024-04-02 DIAGNOSIS — R63.4 WEIGHT LOSS, UNINTENTIONAL: ICD-10-CM

## 2024-04-02 DIAGNOSIS — R63.0 DECREASED APPETITE: ICD-10-CM

## 2024-04-02 DIAGNOSIS — R93.3 ABNORMAL UPPER GASTROINTESTINAL BARIUM SERIES: ICD-10-CM

## 2024-04-02 DIAGNOSIS — K31.7 MULTIPLE GASTRIC POLYPS: ICD-10-CM

## 2024-04-02 DIAGNOSIS — R68.81 EARLY SATIETY: ICD-10-CM

## 2024-04-02 PROCEDURE — 88305 TISSUE EXAM BY PATHOLOGIST: CPT | Performed by: PATHOLOGY

## 2024-04-02 RX ORDER — LIDOCAINE HYDROCHLORIDE 10 MG/ML
INJECTION, SOLUTION EPIDURAL; INFILTRATION; INTRACAUDAL; PERINEURAL AS NEEDED
Status: DISCONTINUED | OUTPATIENT
Start: 2024-04-02 | End: 2024-04-02

## 2024-04-02 RX ORDER — PROPOFOL 10 MG/ML
INJECTION, EMULSION INTRAVENOUS AS NEEDED
Status: DISCONTINUED | OUTPATIENT
Start: 2024-04-02 | End: 2024-04-02

## 2024-04-02 RX ORDER — SODIUM CHLORIDE 9 MG/ML
INJECTION, SOLUTION INTRAVENOUS CONTINUOUS PRN
Status: DISCONTINUED | OUTPATIENT
Start: 2024-04-02 | End: 2024-04-02

## 2024-04-02 RX ADMIN — PROPOFOL 50 MG: 10 INJECTION, EMULSION INTRAVENOUS at 08:07

## 2024-04-02 RX ADMIN — PROPOFOL 100 MG: 10 INJECTION, EMULSION INTRAVENOUS at 08:06

## 2024-04-02 RX ADMIN — LIDOCAINE HYDROCHLORIDE 80 MG: 10 INJECTION, SOLUTION EPIDURAL; INFILTRATION; INTRACAUDAL; PERINEURAL at 08:05

## 2024-04-02 RX ADMIN — PROPOFOL 50 MG: 10 INJECTION, EMULSION INTRAVENOUS at 08:09

## 2024-04-02 RX ADMIN — SODIUM CHLORIDE: 0.9 INJECTION, SOLUTION INTRAVENOUS at 07:59

## 2024-04-02 NOTE — ANESTHESIA PROCEDURE NOTES
Anesthesia Notable Event    Date/Time: 4/2/2024 9:10 AM    Performed by: Fracisco Valdez MD  Authorized by: Fracisco Valdez MD

## 2024-04-02 NOTE — H&P
History and Physical -  [facility]  Sandra Lopez 78 y.o. female MRN: [id number]    HPI: Sandra Lopez is a 78 y.o. year old female with MGUS, APRIL, GERD, fibromyalgia, hx of c diff s/p FMT presenting for EGD for evaluation of decreased appetite, early satiety, gastroparesis (mild delayed gastric emptying on 1/3/24 gastric emptying study 9% at 4 hrs).    Last Hg   Lab Results   Component Value Date    HGB 11.3 (L) 03/07/2024     Last INR   Lab Results   Component Value Date    INR 1.0 01/09/2024     Last Platelets   Lab Results   Component Value Date     03/07/2024       Review of Systems    Historical Information   Past Medical History:   Diagnosis Date    Arthritis     Disease of thyroid gland     Heart failure (HCC)     Pacemaker     Total knee replacement status, left      Past Surgical History:   Procedure Laterality Date    ATRIAL CARDIAC PACEMAKER INSERTION      CHOLECYSTECTOMY      FOOT SURGERY      HYSTERECTOMY      US GUIDED THYROID BIOPSY  7/19/2021    US GUIDED THYROID BIOPSY  11/23/2022     Social History   Social History     Substance and Sexual Activity   Alcohol Use Yes    Comment: very little     Social History     Substance and Sexual Activity   Drug Use No     Social History     Tobacco Use   Smoking Status Never   Smokeless Tobacco Never     Family History   Problem Relation Age of Onset    Hypertension Mother     Cancer Mother     COPD Mother     COPD Father     Diabetes Family     Arthritis Family        Meds/Allergies     (Not in a hospital admission)      Allergies   Allergen Reactions    Sacubitril-Valsartan Anaphylaxis     Possible angioedema    Vancomycin Hives, Itching and Rash    Penicillin G        Objective     /58   Pulse 60   Temp 97.8 °F (36.6 °C)   Resp 20   Wt 77.6 kg (171 lb)   SpO2 96%   BMI 31.28 kg/m²     PRIOR GI PROCEDURES      PHYSICAL EXAM    Gen: NAD  CV: RRR  CHEST: Clear  ABD: soft, NT/ND  EXT: no edema  Neuro:  AAO    ASSESSMENT/PLAN:  This is a 78 y.o. year old female here for EGD    Plan/Procedure: EGD

## 2024-04-02 NOTE — ANESTHESIA POSTPROCEDURE EVALUATION
Post-Op Assessment Note    CV Status:  Stable    Pain management: adequate       Mental Status:  Alert and awake   Hydration Status:  Euvolemic   PONV Controlled:  Controlled   Airway Patency:  Patent     Post Op Vitals Reviewed: Yes    No anethesia notable event occurred.    Staff: Anesthesiologist, CRNA               /60 (04/02/24 0817)    Temp (!) 96.3 °F (35.7 °C) (04/02/24 0817)    Pulse 60 (04/02/24 0817)   Resp 12 (04/02/24 0817)    SpO2 94 % (04/02/24 0817)

## 2024-04-02 NOTE — ANESTHESIA PREPROCEDURE EVALUATION
Procedure:  EGD  ICD in place X 3 years  Relevant Problems   CARDIO   (+) Chest pain   (+) Left bundle branch block      ENDO   (+) Hyperthyroidism      GI/HEPATIC   (+) Esophageal reflux      NEURO/PSYCH   (+) Anxiety   (+) Seizure disorder (HCC)      Cardiovascular/Peripheral Vascular   (+) NICM (nonischemic cardiomyopathy) (HCC)   (+) VT (ventricular tachycardia) (HCC)      Other   (+) Status post biventricular pacemaker   1/24    Left Ventricle: Systolic function is normal with an ejection fraction   of 55-60%.     Left Atrium: Left atrium cavity is mildly dilated.     Right Ventricle: Right ventricle cavity is normal. ICD wire noted.     Mitral Valve: There is moderate regurgitation.     Tricuspid Valve: There is mild regurgitation      Physical Exam    Airway    Mallampati score: II  TM Distance: >3 FB  Neck ROM: full     Dental   Comment: Caps     Cardiovascular      Pulmonary   Breath sounds clear to auscultation    Other Findings  post-pubertal.      Anesthesia Plan  ASA Score- 3     Anesthesia Type- IV sedation with anesthesia with ASA Monitors.         Additional Monitors:     Airway Plan:            Plan Factors-Exercise tolerance (METS): >4 METS.    Chart reviewed. EKG reviewed. Imaging results reviewed. Existing labs reviewed. Patient summary reviewed.    Patient is not a current smoker.              Induction- intravenous.    Postoperative Plan-     Informed Consent- Anesthetic plan and risks discussed with patient and spouse.  I personally reviewed this patient with the CRNA. Discussed and agreed on the Anesthesia Plan with the CRNA..

## 2024-04-04 PROCEDURE — 88305 TISSUE EXAM BY PATHOLOGIST: CPT | Performed by: PATHOLOGY

## 2024-06-06 ENCOUNTER — OFFICE VISIT (OUTPATIENT)
Dept: GASTROENTEROLOGY | Facility: CLINIC | Age: 79
End: 2024-06-06
Payer: COMMERCIAL

## 2024-06-06 VITALS
SYSTOLIC BLOOD PRESSURE: 130 MMHG | WEIGHT: 182 LBS | TEMPERATURE: 97.6 F | DIASTOLIC BLOOD PRESSURE: 70 MMHG | HEIGHT: 62 IN | BODY MASS INDEX: 33.49 KG/M2

## 2024-06-06 DIAGNOSIS — K58.1 IRRITABLE BOWEL SYNDROME WITH CONSTIPATION: ICD-10-CM

## 2024-06-06 DIAGNOSIS — K21.00 GASTROESOPHAGEAL REFLUX DISEASE WITH ESOPHAGITIS WITHOUT HEMORRHAGE: ICD-10-CM

## 2024-06-06 DIAGNOSIS — K31.84 GASTROPARESIS: Primary | ICD-10-CM

## 2024-06-06 PROCEDURE — 99214 OFFICE O/P EST MOD 30 MIN: CPT | Performed by: PHYSICIAN ASSISTANT

## 2024-06-06 RX ORDER — FAMOTIDINE 20 MG/1
20 TABLET, FILM COATED ORAL
Qty: 90 TABLET | Refills: 1 | Status: SHIPPED | OUTPATIENT
Start: 2024-06-06

## 2024-06-06 NOTE — PROGRESS NOTES
[facility] - Outpatient Follow-up Note  Sandra Lopez 78 y.o. female MRN: [id number]  Encounter: [id number]  ASSESSMENT AND PLAN:    1. Gastroparesis  2. Gastroesophageal reflux disease with esophagitis without hemorrhage  Due to her recent upper GI series and EGD and biopsy results.  Patient expressed understanding to results.  All questions answered.  She is feeling better since starting omeprazole 40 mg once daily in the morning.  I recommend that she continue omeprazole 40 mg once daily in the morning at this time.  Will add famotidine 20 mg once daily at bedtime, we will see if this improves her morning nausea.  We discussed the importance of an ongoing GERD and gastroparesis diet and lifestyle.  I recommended she continue to eat small frequent meals.  We discussed avoidance of fatty foods and avoidance of high-fiber foods.  Will place a referral to dietitian/nutritionist at this time for further guidance with gastroparesis diet and education.  Thankfully patient does not have any alarming symptoms at this time.  She is doing better overall.  No plans for further testing at this time.      - Ambulatory Referral to Nutrition Services; Future  - famotidine (PEPCID) 20 mg tablet; Take 1 tablet (20 mg total) by mouth daily at bedtime  Dispense: 90 tablet; Refill: 1    3. Irritable bowel syndrome with constipation  Explained to patient that I suspect that she has underlying IBS- C, constipation with overflow that is contributing to symptoms.  She is up-to-date on screening colonoscopy and does not have any alarm symptoms thankfully.     I provided reassurance.  I recommended patient start MiraLAX powder over-the-counter once daily and titrate as needed to facilitate more regular bowel movements    Patient was instructed to call the office with any questions, concerns, new/ worsening/ persisting GI symptoms. Advised patient go to the ER with any severe or worsening abdominal pain,  fevers/ chills, intractable N/V, chest pain, SOB, dizziness, lightheadedness, feeling something stuck in esophagus that will not go down. Patient expressed understanding and is in agreement with treatment plan.     Will plan to follow up in ~ 3 months   __________________________________________________________    SUBJECTIVE:    Patient with a past medical history of MGUS, iron deficiency, reflux, IBS, hypothyroidism, chronic systolic CHF with nonischemic cardiomyopathy, history of ventricular tachycardia with pacemaker defibrillator in place, fibromyalgia, history of seizure disorder, history of cholecystectomy, hyperlipidemia, history of c diff s/p fecal transplant history of pleural effusion status post thoracentesis presents to the office today for follow-up.  Patient was last seen in the office by me 3/6/2024, previous office note was reviewed.  At last office visit I ordered a stool H. pylori, upper GI series, other infectious stool studies.  I recommended patient start omeprazole 40 mg once daily before breakfast.  Stool H. Pylori negative 3/8/2024  Other infectious stool studies were completed 3/7/2024, these were reviewed and negative for Cryptosporidium, Giardia, ova and parasites, C. difficile, stool enteric panel  Upper GI series was completed 3/18/2024, this was reviewed, this showed at least one gastric polyp  Following upper GI series finding I recommended patient undergo EGD.    Patient underwent EGD 4/2/2024, this revealed 4 subcentimeter polyps with mild erythematous mucosa in the antrun , otherwise normal-appearing esophagus and duodenum.  Gastric biopsy was negative for H. pylori.  Duodenal biopsy was normal and negative for celiac disease.  Gastric polyp came back as fundic gland polyp.      Patient reports feeling better overall compared to last office visit since taking omeprazole 40 mg once daily in the AM.   She complains of  morning nausea. No vomiting.   She is trying to eat small frequent  "meals.   She denies heartburn or acid reflux.   Patient complains that every 3-4 days she has a \"poop day\" where she has several bouts of mushy stools throughout the day with abdominal cramping. She does NOT have any Bms outside of this \"poop day\".   She complains of excess gas.   Her weight is stable and up a few pounds since last office visit.  Patient denies any fevers/ chills, unintentional weight loss, black or bloody stools, dysphagia, odynophagia.         Review of Systems   Constitutional:  Negative for chills and fever.   HENT:  Negative for ear pain and sore throat.    Eyes:  Negative for pain and visual disturbance.   Respiratory:  Negative for cough and shortness of breath.    Cardiovascular:  Negative for chest pain and palpitations.   Gastrointestinal:  Positive for constipation and nausea. Negative for abdominal pain and vomiting.   Genitourinary:  Negative for dysuria and hematuria.   Musculoskeletal:  Negative for arthralgias and back pain.   Skin:  Negative for color change and rash.   Neurological:  Negative for seizures and syncope.   All other systems reviewed and are negative.         Historical Information   Past Medical History:   Diagnosis Date    Arthritis     Disease of thyroid gland     Heart failure (HCC)     Pacemaker     Total knee replacement status, left      Past Surgical History:   Procedure Laterality Date    ATRIAL CARDIAC PACEMAKER INSERTION      CHOLECYSTECTOMY      FOOT SURGERY      HYSTERECTOMY      US GUIDED THYROID BIOPSY  7/19/2021    US GUIDED THYROID BIOPSY  11/23/2022     Social History   Social History     Substance and Sexual Activity   Alcohol Use Yes    Comment: very little     Social History     Substance and Sexual Activity   Drug Use No     Social History     Tobacco Use   Smoking Status Never   Smokeless Tobacco Never     Family History   Problem Relation Age of Onset    Hypertension Mother     Cancer Mother     COPD Mother     COPD Father     Diabetes Family  "    Arthritis Family        Meds/Allergies       Current Outpatient Medications:     Acetaminophen (TYLENOL ARTHRITIS PAIN PO)    albuterol (PROVENTIL HFA,VENTOLIN HFA) 90 mcg/act inhaler    Cymbalta 60 MG delayed release capsule    denosumab (PROLIA) 60 mg/mL    divalproex sodium (DEPAKOTE) 500 mg EC tablet    eplerenone (INSPRA) 50 MG tablet    ergocalciferol (ERGOCALCIFEROL) 74823 units capsule    losartan (COZAAR) 100 MG tablet    methimazole (TAPAZOLE) 5 mg tablet    omeprazole (PriLOSEC) 40 MG capsule    rosuvastatin (CRESTOR) 10 MG tablet    sertraline (ZOLOFT) 100 mg tablet    traZODone (DESYREL) 50 mg tablet    acetaminophen-codeine (TYLENOL with CODEINE #3) 300-30 MG per tablet    clindamycin (CLEOCIN) 300 MG capsule    furosemide (LASIX) 40 mg tablet    HYDROcodone-acetaminophen (NORCO) 7.5-325 mg per tablet    ibuprofen (MOTRIN) 200 mg tablet    metoprolol succinate (TOPROL-XL) 50 mg 24 hr tablet    Allergies   Allergen Reactions    Sacubitril-Valsartan Anaphylaxis     Possible angioedema    Vancomycin Hives, Itching and Rash    Penicillin G            Objective     Wt Readings from Last 3 Encounters:   06/06/24 82.6 kg (182 lb)   04/02/24 77.6 kg (171 lb)   03/06/24 80.3 kg (177 lb)     Temp Readings from Last 3 Encounters:   06/06/24 97.6 °F (36.4 °C) (Tympanic)   04/02/24 (!) 96.3 °F (35.7 °C) (Tympanic)   08/09/23 (!) 97.2 °F (36.2 °C)     BP Readings from Last 3 Encounters:   06/06/24 130/70   04/02/24 113/53   03/06/24 98/58     Pulse Readings from Last 3 Encounters:   04/02/24 61   03/06/24 60   08/09/23 65          PHYSICAL EXAM:      Physical Exam  Vitals reviewed.   Constitutional:       General: She is not in acute distress.     Appearance: She is obese. She is not toxic-appearing.   HENT:      Head: Normocephalic and atraumatic.   Eyes:      Extraocular Movements: Extraocular movements intact.      Conjunctiva/sclera: Conjunctivae normal.   Cardiovascular:      Rate and Rhythm: Normal rate and  regular rhythm.   Pulmonary:      Effort: Pulmonary effort is normal. No respiratory distress.      Breath sounds: Normal breath sounds.   Abdominal:      General: Bowel sounds are normal.      Palpations: Abdomen is soft.      Tenderness: There is no abdominal tenderness.   Musculoskeletal:         General: No swelling or tenderness.      Cervical back: Normal range of motion and neck supple.   Skin:     General: Skin is warm and dry.      Coloration: Skin is not jaundiced.   Neurological:      General: No focal deficit present.      Mental Status: She is alert and oriented to person, place, and time. Mental status is at baseline.   Psychiatric:         Mood and Affect: Mood normal.         Behavior: Behavior normal.         Thought Content: Thought content normal.        Lab Results:   Lab Results   Component Value Date    WBC 7.33 03/07/2024    HGB 11.3 (L) 03/07/2024    HCT 35.9 03/07/2024    MCV 93 03/07/2024     03/07/2024       Lab Results   Component Value Date    SODIUM 138 05/04/2024    K 5.0 05/04/2024     05/04/2024    CO2 31 05/04/2024    AGAP 5 05/04/2024    BUN 8 05/04/2024    CREATININE 0.72 05/04/2024    GLUC 79 05/04/2024    CALCIUM 8.6 05/04/2024    AST 9 05/04/2024    ALT 6 05/04/2024    ALKPHOS 48 05/04/2024    TP 6.3 05/04/2024    TBILI 0.4 05/04/2024    EGFR 86 05/04/2024     Lab Results   Component Value Date    TSH 3.44 09/16/2023       Celiac panel 9/30/2023 reviewed from [facility], this was normal/negative     Radiology Results:   CT of the abdomen and pelvis with IV contrast was completed 1/10/2024 at [facility] for abdominal pain, this was reviewed, this showed sigmoid diverticulosis, otherwise no acute abnormality in the abdomen or pelvis.  A persistent left effusion with left basilar consolidation was seen.  Normal liver.  Patient status post cholecystectomy.  Pancreas within normal limits.  Bowel otherwise normal.  There was a small fat-containing midline hernia without inflammatory  straining seen in the abdominal wall.  No suspiciously enlarged lymph nodes.     Nuclear medicine gastric emptying scan completed at [facility] 1/3/2024 reviewed, this was done for early satiety, overall findings suggest mild delayed solid gastric emptying.  T half-time 154 minutes.     Prior Procedure Results/Reports   Patient underwent EGD and colonoscopy 5/12/2022 with [facility].  Procedure reports reviewed in the chart.       EGD revealed LA grade a esophagitis.  Also antral gastritis.  There is mild gastric body erythema which was biopsied.  Normal duodenum.  Duodenal biopsy was normal, negative for celiac disease.  Gastric biopsies unremarkable, negative for H. pylori      Colonoscopy reviewed as well, this showed sigmoid diverticulosis in the setting of a fixed and tortuous colon.  The prep was fair.  A scar from prior hemorrhoid treated was noted.  External hemorrhoids were seen.  Random colon biopsies were normal, negative for microscopic colitis.    Kylee Padilla PA-C   Available on [employer]

## 2024-07-08 DIAGNOSIS — K21.00 GASTROESOPHAGEAL REFLUX DISEASE WITH ESOPHAGITIS WITHOUT HEMORRHAGE: ICD-10-CM

## 2024-07-08 DIAGNOSIS — R63.0 DECREASED APPETITE: ICD-10-CM

## 2024-07-09 RX ORDER — OMEPRAZOLE 40 MG/1
CAPSULE, DELAYED RELEASE ORAL
Qty: 30 CAPSULE | Refills: 3 | Status: SHIPPED | OUTPATIENT
Start: 2024-07-09

## 2024-09-18 ENCOUNTER — OFFICE VISIT (OUTPATIENT)
Dept: GASTROENTEROLOGY | Facility: CLINIC | Age: 79
End: 2024-09-18
Payer: MEDICARE

## 2024-09-18 VITALS
TEMPERATURE: 98.3 F | BODY MASS INDEX: 33.9 KG/M2 | SYSTOLIC BLOOD PRESSURE: 110 MMHG | DIASTOLIC BLOOD PRESSURE: 66 MMHG | WEIGHT: 184.2 LBS | HEIGHT: 62 IN

## 2024-09-18 DIAGNOSIS — K58.1 IRRITABLE BOWEL SYNDROME WITH CONSTIPATION: Primary | ICD-10-CM

## 2024-09-18 DIAGNOSIS — K21.00 GASTROESOPHAGEAL REFLUX DISEASE WITH ESOPHAGITIS WITHOUT HEMORRHAGE: ICD-10-CM

## 2024-09-18 DIAGNOSIS — K31.84 GASTROPARESIS: ICD-10-CM

## 2024-09-18 PROCEDURE — 99213 OFFICE O/P EST LOW 20 MIN: CPT | Performed by: PHYSICIAN ASSISTANT

## 2024-09-18 NOTE — PROGRESS NOTES
[facility] - Outpatient Follow-up Note  Sandra Lopez 79 y.o. female MRN: [id number]  Encounter: [id number]  ASSESSMENT AND PLAN:    1. Irritable bowel syndrome with constipation  I explained to the patient again today that I suspect she has underlying IBS-C, constipation with overflow.  I provided education on this.  All questions were answered. We discussed IBS-C at length and that the treatment involves diet and lifestyle as well as medications.  We also discussed how stress can play a role in IBS symptoms.     Patient is up-to-date on screening colonoscopy and does not have any alarming symptoms.      I recommended patient begin MiraLAX powder over-the-counter once daily and titrate as needed to facilitate more regular and complete bowel movements  I recommended patient drink at least 60 ounces of water a day if able and exercise as tolerated daily.  I also recommended patient try her best to get enough sleep and lower stress levels.  She expressed understanding to this.  No plans for further testing at this time.  I provided reassurance.    2. Gastroparesis  3. Gastroesophageal reflux disease with esophagitis without hemorrhage  Stable and well-controlled on current antiacid medication regimen.  She is not having any alarming symptoms.  I recommend she continue with omeprazole 40 mg once daily and famotidine 20 mg daily at bedtime.  We discussed that she should try her best to eat small, frequent meals throughout the day and avoid high fat and high fiber foods.  She expressed understanding to this.    Patient was instructed to call the office with any questions, concerns, new/ worsening/ persisting GI symptoms. Advised patient go to the ER with any severe or worsening abdominal pain, fevers/ chills, intractable N/V, chest pain, SOB, dizziness, lightheadedness, feeling something stuck in esophagus that will not go down. Patient expressed understanding and is in agreement with  treatment plan.     Will plan to follow up in 6 months    __________________________________________________________    SUBJECTIVE:    Patient with a past medical history of MGUS, iron deficiency, reflux, IBS, hypothyroidism, chronic systolic CHF with nonischemic cardiomyopathy, history of ventricular tachycardia with pacemaker defibrillator in place, fibromyalgia, history of seizure disorder, history of cholecystectomy, hyperlipidemia, history of c diff s/p fecal transplant history of pleural effusion status post thoracentesis presents to the office today for follow-up.  Patient was last seen by me in the office 6/6/2024, previous office note was reviewed.  At last office visit I recommend patient start MiraLAX powder over-the-counter once daily and continue omeprazole 40 mg once daily in the morning and start famotidine 20 mg once daily at bedtime.  At last office visit I also placed a referral to a dietitian/nutritionist for further guidance on gastroparesis diet.    Patient reports feeling about the same as last office visit.  She is taking omeprazole 40 mg once daily morning and famotidine 20 mg once daily at bedtime.  This is helping heartburn and reflux symptoms.  She has rare nausea at this point.  No vomiting.  Patient's weight is stable, she has gained 2 pounds since last office visit.  Patient continues with bowel issues.  She never tried taking MiraLAX powder over-the-counter once daily as previously recommended.  She continues with going several days without a bowel movement and then having several bowel movements in 1 day, maybe once a week after eating.  Stools is initially harder and then can vary in caliber and become loose.  Patient also admits to very poor water intake.  Sometimes she can go a whole day without drinking any water and only drinks 1 can of Diet Coke.  She admits she is under a lot of stress.     Review of Systems   Constitutional:  Negative for chills and fever.   HENT:  Negative  for ear pain and sore throat.    Eyes:  Negative for pain and visual disturbance.   Respiratory:  Negative for cough and shortness of breath.    Cardiovascular:  Negative for chest pain and palpitations.   Gastrointestinal:  Positive for constipation. Negative for abdominal pain and vomiting.   Genitourinary:  Negative for dysuria and hematuria.   Musculoskeletal:  Negative for arthralgias and back pain.   Skin:  Negative for color change and rash.   Neurological:  Negative for seizures and syncope.   All other systems reviewed and are negative.     Historical Information   Past Medical History:   Diagnosis Date    Arthritis     Disease of thyroid gland     Heart failure (HCC)     Pacemaker     Total knee replacement status, left      Past Surgical History:   Procedure Laterality Date    ATRIAL CARDIAC PACEMAKER INSERTION      CHOLECYSTECTOMY      FOOT SURGERY      HYSTERECTOMY      US GUIDED THYROID BIOPSY  7/19/2021    US GUIDED THYROID BIOPSY  11/23/2022     Social History   Social History     Substance and Sexual Activity   Alcohol Use Yes    Comment: very little     Social History     Substance and Sexual Activity   Drug Use No     Social History     Tobacco Use   Smoking Status Never   Smokeless Tobacco Never     Family History   Problem Relation Age of Onset    Hypertension Mother     Cancer Mother     COPD Mother     COPD Father     Diabetes Family     Arthritis Family        Meds/Allergies       Current Outpatient Medications:     Acetaminophen (TYLENOL ARTHRITIS PAIN PO)    albuterol (PROVENTIL HFA,VENTOLIN HFA) 90 mcg/act inhaler    Cymbalta 60 MG delayed release capsule    denosumab (PROLIA) 60 mg/mL    divalproex sodium (DEPAKOTE) 500 mg EC tablet    eplerenone (INSPRA) 50 MG tablet    ergocalciferol (ERGOCALCIFEROL) 51146 units capsule    famotidine (PEPCID) 20 mg tablet    ipratropium (ATROVENT) 0.03 % nasal spray    losartan (COZAAR) 100 MG tablet    methimazole (TAPAZOLE) 5 mg tablet    metoprolol  succinate (TOPROL-XL) 50 mg 24 hr tablet    omeprazole (PriLOSEC) 40 MG capsule    rosuvastatin (CRESTOR) 10 MG tablet    sertraline (ZOLOFT) 100 mg tablet    traZODone (DESYREL) 50 mg tablet    acetaminophen-codeine (TYLENOL with CODEINE #3) 300-30 MG per tablet    clindamycin (CLEOCIN) 300 MG capsule    furosemide (LASIX) 40 mg tablet    HYDROcodone-acetaminophen (NORCO) 7.5-325 mg per tablet    ibuprofen (MOTRIN) 200 mg tablet    Allergies   Allergen Reactions    Sacubitril-Valsartan Anaphylaxis     Possible angioedema    Vancomycin Hives, Itching and Rash    Penicillin G            Objective     Wt Readings from Last 3 Encounters:   09/18/24 83.6 kg (184 lb 3.2 oz)   09/09/24 82.6 kg (182 lb)   06/06/24 82.6 kg (182 lb)     Temp Readings from Last 3 Encounters:   09/18/24 98.3 °F (36.8 °C) (Tympanic)   06/06/24 97.6 °F (36.4 °C) (Tympanic)   04/02/24 (!) 96.3 °F (35.7 °C) (Tympanic)     BP Readings from Last 3 Encounters:   09/18/24 110/66   06/06/24 130/70   04/02/24 113/53     Pulse Readings from Last 3 Encounters:   04/02/24 61   03/06/24 60   08/09/23 65          PHYSICAL EXAM:      Physical Exam  Vitals reviewed.   Constitutional:       General: She is not in acute distress.     Appearance: She is obese. She is not ill-appearing, toxic-appearing or diaphoretic.   HENT:      Head: Normocephalic and atraumatic.   Eyes:      Extraocular Movements: Extraocular movements intact.      Conjunctiva/sclera: Conjunctivae normal.   Cardiovascular:      Rate and Rhythm: Normal rate and regular rhythm.   Pulmonary:      Effort: Pulmonary effort is normal. No respiratory distress.      Breath sounds: Normal breath sounds.   Abdominal:      General: Bowel sounds are normal. There is no distension.      Palpations: Abdomen is soft.      Tenderness: There is abdominal tenderness (minimal generalized). There is no guarding.   Musculoskeletal:         General: No swelling or tenderness.      Cervical back: Normal range of  motion and neck supple.   Skin:     General: Skin is warm and dry.      Coloration: Skin is not jaundiced.   Neurological:      General: No focal deficit present.      Mental Status: She is alert and oriented to person, place, and time. Mental status is at baseline.   Psychiatric:         Mood and Affect: Mood normal.         Behavior: Behavior normal.         Thought Content: Thought content normal.          Lab Results:   Lab Results   Component Value Date    WBC 7.33 03/07/2024    HGB 11.3 (L) 03/07/2024    HCT 35.9 03/07/2024    MCV 93 03/07/2024     03/07/2024       Lab Results   Component Value Date    SODIUM 138 05/04/2024    K 5.0 05/04/2024     05/04/2024    CO2 31 05/04/2024    AGAP 5 05/04/2024    BUN 8 05/04/2024    CREATININE 0.72 05/04/2024    GLUC 79 05/04/2024    CALCIUM 8.6 05/04/2024    AST 9 05/04/2024    ALT 6 05/04/2024    ALKPHOS 48 05/04/2024    TP 6.3 05/04/2024    TBILI 0.4 05/04/2024    EGFR 86 05/04/2024     Lab Results   Component Value Date    TSH 3.44 09/16/2023     Lab Results   Component Value Date    IRON 52 03/07/2024    TIBC 275 03/07/2024    FERRITIN 95 03/07/2024       Celiac panel 9/30/2023 reviewed from [facility], this was normal/negative      Stool H. Pylori negative 3/8/2024    Other infectious stool studies were completed 3/7/2024, these were reviewed and negative for Cryptosporidium, Giardia, ova and parasites, C. difficile, stool enteric panel    Radiology Results:   CT of the abdomen and pelvis with IV contrast was completed 1/10/2024 at [facility] for abdominal pain, this was reviewed, this showed sigmoid diverticulosis, otherwise no acute abnormality in the abdomen or pelvis.  A persistent left effusion with left basilar consolidation was seen.  Normal liver.  Patient status post cholecystectomy.  Pancreas within normal limits.  Bowel otherwise normal.  There was a small fat-containing midline hernia without inflammatory straining seen in the abdominal wall.  No  suspiciously enlarged lymph nodes.     Nuclear medicine gastric emptying scan completed at [facility] 1/3/2024 reviewed, this was done for early satiety, overall findings suggest mild delayed solid gastric emptying.  T half-time 154 minutes.    XR chest pa and lateral    Result Date: 9/4/2024  Narrative: Study: Two-view chest. COMPARISON: April 25, 2024. HISTORY: Pressure. Elevated left hemidiaphragm similar prior study, the base obscured due to superimposed pacer unit. There is blunting to the posterior and lateral costophrenic angle greater on the left than the right. Marked density right cardiophrenic angle secondary to epicardial fat pad. Lung fields without interval infiltrates or edema. No pneumothorax seen.    Impression: IMPRESSION: No interval change. Chronic pleural scarring versus residual fluid left base posteriorly. Lung fields free of interval infiltrates. Left retrocardiac infiltrate on prior study resolved. Workstation:[id number]    Upper GI series was completed 3/18/2024, this was reviewed, this showed at least one gastric polyp     Prior Procedure Results/Reports   Patient underwent EGD 4/2/2024, this revealed 4 subcentimeter polyps with mild erythematous mucosa in the antrun , otherwise normal-appearing esophagus and duodenum.  Gastric biopsy was negative for H. pylori.  Duodenal biopsy was normal and negative for celiac disease.  Gastric polyp came back as fundic gland polyp.     Patient underwent EGD and colonoscopy 5/12/2022 with [facility].  Procedure reports reviewed in the chart.    EGD revealed LA grade a esophagitis.  Also antral gastritis.  There is mild gastric body erythema which was biopsied.  Normal duodenum.  Duodenal biopsy was normal, negative for celiac disease.  Gastric biopsies unremarkable, negative for H. pylori   Colonoscopy reviewed as well, this showed sigmoid diverticulosis in the setting of a fixed and tortuous colon.  The prep was fair.  A scar from prior hemorrhoid treated was  noted.  External hemorrhoids were seen.  Random colon biopsies were normal, negative for microscopic colitis.    Kylee Padilla PA-C

## 2024-11-28 DIAGNOSIS — K21.00 GASTROESOPHAGEAL REFLUX DISEASE WITH ESOPHAGITIS WITHOUT HEMORRHAGE: ICD-10-CM

## 2024-11-29 RX ORDER — FAMOTIDINE 20 MG/1
20 TABLET, FILM COATED ORAL
Qty: 90 TABLET | Refills: 1 | Status: SHIPPED | OUTPATIENT
Start: 2024-11-29

## 2024-12-16 ENCOUNTER — ANNUAL EXAM (OUTPATIENT)
Dept: OBGYN CLINIC | Facility: CLINIC | Age: 79
End: 2024-12-16
Payer: COMMERCIAL

## 2024-12-16 VITALS — SYSTOLIC BLOOD PRESSURE: 110 MMHG | BODY MASS INDEX: 34.39 KG/M2 | DIASTOLIC BLOOD PRESSURE: 82 MMHG | WEIGHT: 188 LBS

## 2024-12-16 DIAGNOSIS — Z01.419 WOMEN'S ANNUAL ROUTINE GYNECOLOGICAL EXAMINATION: Primary | ICD-10-CM

## 2024-12-16 PROCEDURE — S0612 ANNUAL GYNECOLOGICAL EXAMINA: HCPCS | Performed by: OBSTETRICS & GYNECOLOGY

## 2024-12-16 PROCEDURE — G0476 HPV COMBO ASSAY CA SCREEN: HCPCS | Performed by: OBSTETRICS & GYNECOLOGY

## 2024-12-16 PROCEDURE — G0145 SCR C/V CYTO,THINLAYER,RESCR: HCPCS | Performed by: OBSTETRICS & GYNECOLOGY

## 2024-12-16 NOTE — PROGRESS NOTES
Name: Sandra Lopez      : 1945      MRN: [id number]  Encounter Provider: Rohan Haywood MD  Encounter Date: 2024   Encounter department: [facility]  :  Assessment & Plan  Women's annual routine gynecological examination  Patient was informed of a stable menopausal GYN examination.  She will continue to follow her primary care team for a host of medical problems including hypertension osteoporosis depression elevated cholesterol and fibromyalgia.  She has a history of a pacemaker.  Continue with her cardiologist as directed.  She should return to my office in 1 year.  Pap smear was performed.  The rest of the pelvic examination was benign.  Continue getting yearly mammograms.  Her colonoscopies are up-to-date.  Orders:    Liquid-based pap, screening        History of Present Illness   HPI  Sandra Lopez is a 79 y.o. female who presents for her annual GYN examination.  She is a  3 para 3 she is menopausal.  She does sexually active because of her 's health.  She is still gainfully employed.  She is not happy with her weight.  She feels safe at home.  She sees a dentist on a regular basis.  History of stress urine incontinence in the past.  Status post vaginal sling procedure which is working well still.  Colonoscopies are up-to-date.  Past medical history significant for fibromyalgia hypertension hypothyroidism total knee replacement history of seizure disorder.  Positive history of depression anxiety currently on Zoloft.  Her PHQ score today total of 14 August is secondary to the stress at work and her supervisor.  There are other issues there.  She denies any thoughts of suicide.  Family history reviewed.  No new major family illnesses to report.  The patient does need a Pap smear today.  She also has a history of a pacemaker which is going well.      Review of Systems   All other systems reviewed and are negative.         Objective   /82   Wt 85.3 kg (188 lb)    BMI 34.39 kg/m²      Physical Exam  Vitals reviewed. Exam conducted with a chaperone present.   HENT:      Head: Normocephalic and atraumatic.      Nose: Nose normal.      Mouth/Throat:      Mouth: Mucous membranes are moist.   Eyes:      Extraocular Movements: Extraocular movements intact.      Pupils: Pupils are equal, round, and reactive to light.   Cardiovascular:      Rate and Rhythm: Normal rate.      Heart sounds: Normal heart sounds.   Pulmonary:      Effort: Pulmonary effort is normal.      Breath sounds: Normal breath sounds.   Chest:   Breasts:     Breasts are symmetrical.      Right: Normal.      Left: Normal.   Abdominal:      General: Abdomen is flat. Bowel sounds are normal.      Palpations: Abdomen is soft. There is no hepatomegaly or splenomegaly.      Tenderness: There is no abdominal tenderness.      Hernia: No hernia is present. There is no hernia in the left inguinal area or right inguinal area.   Genitourinary:     General: Normal vulva.      Pubic Area: No rash or pubic lice.       Labia:         Right: No rash, tenderness or lesion.         Left: No rash, tenderness, lesion or injury.       Urethra: No prolapse or urethral pain.      Vagina: Normal. No signs of injury and foreign body. No vaginal discharge, erythema, tenderness, bleeding, lesions or prolapsed vaginal walls.      Uterus: Absent.       Adnexa: Right adnexa normal and left adnexa normal.        Right: No mass, tenderness or fullness.          Left: No mass, tenderness or fullness.        Rectum: Normal.      Comments: The external genitalia within the normal limits, the vagina is clean.  The cervix and uterus are surgically removed.  Walls well supported.  A Pap smear the vaginal vault was taken.  There is no sign of prolapse.  Reason bladder normal working relationship.  Musculoskeletal:         General: Normal range of motion.      Cervical back: Normal range of motion and neck supple.   Lymphadenopathy:      Upper Body:       Right upper body: No supraclavicular adenopathy.      Left upper body: No supraclavicular adenopathy.      Lower Body: No right inguinal adenopathy. No left inguinal adenopathy.   Skin:     General: Skin is warm and dry.   Neurological:      General: No focal deficit present.      Mental Status: She is alert and oriented to person, place, and time.   Psychiatric:         Mood and Affect: Mood normal.         Thought Content: Thought content normal.

## 2024-12-16 NOTE — PATIENT INSTRUCTIONS
The patient was informed of a stable menopausal GYN examination.  A Pap smear the vaginal vault was taken.  She was reminded to follow the directions and plans of her primary care team.  Particular with her hypertension seizure disorder heart disease pacemaker and depression.  She should return to my office in 1 year unless new GYN complaints or issues occur.

## 2024-12-17 LAB
HPV HR 12 DNA CVX QL NAA+PROBE: NEGATIVE
HPV16 DNA CVX QL NAA+PROBE: NEGATIVE
HPV18 DNA CVX QL NAA+PROBE: NEGATIVE

## 2024-12-19 LAB
LAB AP GYN PRIMARY INTERPRETATION: NORMAL
Lab: NORMAL

## 2025-05-20 ENCOUNTER — TELEPHONE (OUTPATIENT)
Age: 80
End: 2025-05-20

## 2025-05-20 NOTE — TELEPHONE ENCOUNTER
Received call from Marce from [facility]. Asking to speak to Fatemeh in Dr Larsen's office. Marce stated she received a message from their  informing her that Fatemeh called to request patient's office visit notes, biopsy reports and lab results. Marce called to confirm this message, as patient does not have cancer and was referred to an allergist by their office and not to Dr Larsen's office. She is asking for a return call from Fatemeh to clarify this request, and will like to know if patient called the office. Review of chart shows no documentation of the call.   Please review and call Marce on  for clarification.